# Patient Record
Sex: MALE | Race: WHITE | NOT HISPANIC OR LATINO | Employment: OTHER | URBAN - METROPOLITAN AREA
[De-identification: names, ages, dates, MRNs, and addresses within clinical notes are randomized per-mention and may not be internally consistent; named-entity substitution may affect disease eponyms.]

---

## 2017-01-04 ENCOUNTER — TELEPHONE (OUTPATIENT)
Dept: NEUROLOGY | Facility: CLINIC | Age: 52
End: 2017-01-04

## 2017-01-04 NOTE — TELEPHONE ENCOUNTER
----- Message from Karissa Cote sent at 1/3/2017  3:59 PM CST -----  Wife (Ashlyn) call to see if they can get a sooner appt them 01/16/pls call if something is available at [phone]

## 2017-02-03 ENCOUNTER — HOSPITAL ENCOUNTER (OUTPATIENT)
Dept: RADIOLOGY | Facility: HOSPITAL | Age: 52
Discharge: HOME | End: 2017-02-03
Attending: PSYCHIATRY & NEUROLOGY
Payer: COMMERCIAL

## 2017-02-03 ENCOUNTER — OFFICE VISIT (OUTPATIENT)
Dept: NEUROLOGY | Facility: CLINIC | Age: 52
End: 2017-02-03
Payer: COMMERCIAL

## 2017-02-03 ENCOUNTER — TELEPHONE (OUTPATIENT)
Dept: NEUROLOGY | Facility: CLINIC | Age: 52
End: 2017-02-03

## 2017-02-03 VITALS
SYSTOLIC BLOOD PRESSURE: 153 MMHG | BODY MASS INDEX: 27.09 KG/M2 | HEART RATE: 62 BPM | RESPIRATION RATE: 20 BRPM | HEIGHT: 72 IN | WEIGHT: 200 LBS | DIASTOLIC BLOOD PRESSURE: 97 MMHG

## 2017-02-03 DIAGNOSIS — G40.909 SEIZURE DISORDER: ICD-10-CM

## 2017-02-03 DIAGNOSIS — Z79.899 HIGH RISK MEDICATION USE: ICD-10-CM

## 2017-02-03 DIAGNOSIS — A53.0 POSITIVE RPR TEST: ICD-10-CM

## 2017-02-03 DIAGNOSIS — G40.909 SEIZURE DISORDER: Primary | ICD-10-CM

## 2017-02-03 PROCEDURE — 77080 DXA BONE DENSITY AXIAL: CPT | Mod: 26,,, | Performed by: RADIOLOGY

## 2017-02-03 PROCEDURE — 70260 X-RAY EXAM OF SKULL: CPT | Mod: 26,,, | Performed by: RADIOLOGY

## 2017-02-03 PROCEDURE — 70260 X-RAY EXAM OF SKULL: CPT | Mod: TC,PO

## 2017-02-03 PROCEDURE — 77080 DXA BONE DENSITY AXIAL: CPT | Mod: TC,PO

## 2017-02-03 PROCEDURE — 99999 PR PBB SHADOW E&M-EST. PATIENT-LVL IV: CPT | Mod: PBBFAC,,, | Performed by: PSYCHIATRY & NEUROLOGY

## 2017-02-03 PROCEDURE — 99215 OFFICE O/P EST HI 40 MIN: CPT | Mod: S$GLB,,, | Performed by: PSYCHIATRY & NEUROLOGY

## 2017-02-03 RX ORDER — LACOSAMIDE 50 MG/1
TABLET ORAL
Qty: 120 TABLET | Refills: 1 | Status: SHIPPED | OUTPATIENT
Start: 2017-02-03 | End: 2017-06-27
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
## 2017-02-03 NOTE — MR AVS SNAPSHOT
[facility]  [address]  [facility] [address]  Phone: [phone]  Fax: [phone]                  Earnest Morgan   2/3/2017 8:40 AM   Office Visit    Description:  Male : 1965   Provider:  Rashaad Castrejon Jr., MD   Department:  [facility]           Reason for Visit     Seizures     Memory Loss           Diagnoses this Visit        Comments    Seizure disorder    -  Primary     High risk medication use         Positive RPR test                To Do List           Future Appointments        Provider Department Dept Phone    2/3/2017 10:15 AM [facility] [id number] [facility] [phone]    2/3/2017 10:40 AM [facility] DEXA1 [facility] [phone]    2017 11:45 AM LAB, [place] [facility] [phone]    3/16/2017 3:40 PM Rashaad Castrejon Jr., MD [facility] [phone]      Goals (5 Years of Data)     None       These Medications        Disp Refills Start End    lacosamide (VIMPAT) 50 mg Tab 120 tablet 1 2/3/2017     1 tab PO bid x1 week, then 2 tabs PO bid thereafter    Pharmacy: [address] Ph #: [phone]         [facility]     [facility] Nurse Care Line -  Assistance  Registered nurses in the [facility] provide clinical advisement, health education, appointment booking, and other advisory services.  Call for this free service at [phone].             Medications           Message regarding Medications     Verify the changes and/or additions to your medication regime listed below are the same as discussed with your clinician today.  If any of these changes or additions are incorrect, please notify your healthcare provider.        START taking these NEW medications        Refills    lacosamide (VIMPAT) 50 mg Tab 1    Si tab PO bid x1 week, then 2 tabs PO bid thereafter    Class: Print           Verify that the below list  of medications is an accurate representation of the medications you are currently taking.  If none reported, the list may be blank. If incorrect, please contact your healthcare provider. Carry this list with you in case of emergency.           Current Medications     alprazolam (XANAX) 0.5 MG tablet Take 1 tablet (0.5 mg total) by mouth as needed for Anxiety (Take 1 (one) tablet the night BEFORE MRI, 1 tab morning of MRI, 1 - 2 tabs 30-60 minutes before MRI; do NOT drive (MUST have a ).). Take before MRI/procedure/test. Do NOT drive. You need a .    amlodipine (NORVASC) 10 MG tablet Take 10 mg by mouth once daily.    lacosamide (VIMPAT) 50 mg Tab 1 tab PO bid x1 week, then 2 tabs PO bid thereafter    phenytoin (DILANTIN) 100 MG ER capsule Take 300 mg by mouth 2 (two) times daily. 300 mg BID           Clinical Reference Information           Your Vitals Were     BP Pulse Resp Height Weight BMI    153/97 62 20 6' (1.829 m) 90.7 kg (200 lb) 27.12 kg/m2      Blood Pressure          Most Recent Value    BP  (!)  153/97      Allergies as of 2/3/2017     No Known Allergies      Immunizations Administered on Date of Encounter - 2/3/2017     None      Orders Placed During Today's Visit      Normal Orders This Visit    MRI Brain W WO Contrast     Future Labs/Procedures Expected by Expires    CBC auto differential  2/3/2017 4/4/2018    Comprehensive metabolic panel  2/3/2017 4/4/2018    DXA Bone Density Spine And Hip_Axial Skeleton  2/3/2017 2/3/2018    FTA ANTIBODIES, IGG AND IGM  2/3/2017 4/4/2018    MRI Brain W WO Contrast  2/3/2017 2/3/2018    Phenytoin level, total  2/3/2017 4/4/2018    RPR  2/3/2017 4/4/2018    X-Ray Skull Complete Min 4 Views  2/3/2017 2/3/2018    EEG,w/awake & asleep record  As directed 2/3/2018      Language Assistance Services     ATTENTION: Language assistance services are available, free of charge. Please call [phone].      ATENCIÓN: Si habla español, tiene a obregon disposición  servicios gratuitos de asistencia lingüística. Robert cuevas [phone].     [facility] Ý: N?u b?n nói Ti?ng Vi?t, có các d?ch v? h? tr? ngôn ng? mi?n phí dành cho b?n. G?i s? [phone].         [facility] complies with applicable Federal civil rights laws and does not discriminate on the basis of race, color, national origin, age, disability, or sex.

## 2017-02-03 NOTE — LETTER
February 3, 2017      Chris Babb MD  [address]  [address]  [facility] [address]           [place] [facility]  [address] [place] [address]  Phone: [phone]  Fax: [phone]          Patient: Earnest Moragn   MR Number: [id number]   YOB: 1965   Date of Visit: 2/3/2017       Dear Dr. Chris Babb:    Thank you for referring Earnest Morgan to me for evaluation. Attached you will find relevant portions of my assessment and plan of care.    If you have questions, please do not hesitate to call me. I look forward to following Earnest Morgan along with you.    Sincerely,    Rashaad Castrejon Jr., MD    Enclosure  CC:  No Recipients    If you would like to receive this communication electronically, please contact [email or web address] or [phone] to request more information on [employer] Link access.    For providers and/or their staff who would like to refer a patient to [facility], please contact us through our one-stop-shop provider referral line, [facility], at [phone].    If you feel you have received this communication in error or would no longer like to receive these types of communications, please e-mail [email or web address]

## 2017-02-04 ENCOUNTER — LAB VISIT (OUTPATIENT)
Dept: LAB | Facility: HOSPITAL | Age: 52
End: 2017-02-04
Attending: PSYCHIATRY & NEUROLOGY
Payer: COMMERCIAL

## 2017-02-04 DIAGNOSIS — G40.909 SEIZURE DISORDER: ICD-10-CM

## 2017-02-04 DIAGNOSIS — Z79.899 HIGH RISK MEDICATION USE: ICD-10-CM

## 2017-02-04 DIAGNOSIS — A53.0 POSITIVE RPR TEST: ICD-10-CM

## 2017-02-04 LAB
ALBUMIN SERPL BCP-MCNC: 3.8 G/DL
ALP SERPL-CCNC: 121 U/L
ALT SERPL W/O P-5'-P-CCNC: 13 U/L
ANION GAP SERPL CALC-SCNC: 9 MMOL/L
AST SERPL-CCNC: 15 U/L
BASOPHILS # BLD AUTO: 0.01 K/UL
BASOPHILS NFR BLD: 0.2 %
BILIRUB SERPL-MCNC: 0.3 MG/DL
BUN SERPL-MCNC: 20 MG/DL
CALCIUM SERPL-MCNC: 8.4 MG/DL
CHLORIDE SERPL-SCNC: 110 MMOL/L
CO2 SERPL-SCNC: 21 MMOL/L
CREAT SERPL-MCNC: 1.4 MG/DL
DIFFERENTIAL METHOD: ABNORMAL
EOSINOPHIL # BLD AUTO: 0 K/UL
EOSINOPHIL NFR BLD: 0.5 %
ERYTHROCYTE [DISTWIDTH] IN BLOOD BY AUTOMATED COUNT: 13.1 %
EST. GFR  (AFRICAN AMERICAN): >60 ML/MIN/1.73 M^2
EST. GFR  (NON AFRICAN AMERICAN): 58.2 ML/MIN/1.73 M^2
GLUCOSE SERPL-MCNC: 82 MG/DL
HCT VFR BLD AUTO: 38 %
HGB BLD-MCNC: 12.7 G/DL
LYMPHOCYTES # BLD AUTO: 1.3 K/UL
LYMPHOCYTES NFR BLD: 21.1 %
MCH RBC QN AUTO: 29.5 PG
MCHC RBC AUTO-ENTMCNC: 33.4 %
MCV RBC AUTO: 88 FL
MONOCYTES # BLD AUTO: 0.6 K/UL
MONOCYTES NFR BLD: 9.2 %
NEUTROPHILS # BLD AUTO: 4.3 K/UL
NEUTROPHILS NFR BLD: 68.7 %
PHENYTOIN SERPL-MCNC: 10.3 UG/ML
PLATELET # BLD AUTO: 148 K/UL
PMV BLD AUTO: 10.8 FL
POTASSIUM SERPL-SCNC: 4.8 MMOL/L
PROT SERPL-MCNC: 7.2 G/DL
RBC # BLD AUTO: 4.3 M/UL
SODIUM SERPL-SCNC: 140 MMOL/L
WBC # BLD AUTO: 6.22 K/UL

## 2017-02-04 PROCEDURE — 80053 COMPREHEN METABOLIC PANEL: CPT

## 2017-02-04 PROCEDURE — 85025 COMPLETE CBC W/AUTO DIFF WBC: CPT

## 2017-02-04 PROCEDURE — 86780 TREPONEMA PALLIDUM: CPT

## 2017-02-04 PROCEDURE — 86592 SYPHILIS TEST NON-TREP QUAL: CPT

## 2017-02-04 PROCEDURE — 86593 SYPHILIS TEST NON-TREP QUANT: CPT

## 2017-02-04 PROCEDURE — 36415 COLL VENOUS BLD VENIPUNCTURE: CPT | Mod: PO

## 2017-02-04 PROCEDURE — 80185 ASSAY OF PHENYTOIN TOTAL: CPT

## 2017-02-06 LAB
RPR SER QL: REACTIVE
RPR SER-TITR: ABNORMAL {TITER}

## 2017-02-08 ENCOUNTER — TELEPHONE (OUTPATIENT)
Dept: NEUROLOGY | Facility: CLINIC | Age: 52
End: 2017-02-08

## 2017-02-09 LAB — T PALLIDUM AB SER QL IF: NORMAL

## 2017-02-10 ENCOUNTER — TELEPHONE (OUTPATIENT)
Dept: NEUROLOGY | Facility: CLINIC | Age: 52
End: 2017-02-10

## 2017-02-10 NOTE — TELEPHONE ENCOUNTER
Left message that the MRI should not be done at [facility].  Dr. Castrejon wants a 3 T MRI at [facility]. Awaiting call back from patient.  IM send to Nancy Ly in radiology.

## 2017-03-06 ENCOUNTER — TELEPHONE (OUTPATIENT)
Dept: NEUROLOGY | Facility: CLINIC | Age: 52
End: 2017-03-06

## 2017-03-06 NOTE — TELEPHONE ENCOUNTER
----- Message from Charity Fuller sent at 3/6/2017  9:44 AM CST -----  [facility]  / [phone] office [phone] / Raissa Jones / asking for information on test done in [facility]

## 2017-06-27 ENCOUNTER — TELEPHONE (OUTPATIENT)
Dept: NEUROLOGY | Facility: CLINIC | Age: 52
End: 2017-06-27

## 2017-06-27 DIAGNOSIS — G40.909 SEIZURE DISORDER: ICD-10-CM

## 2017-06-27 RX ORDER — LACOSAMIDE 200 MG/1
200 TABLET ORAL 2 TIMES DAILY
Qty: 60 TABLET | Refills: 11 | Status: SHIPPED | OUTPATIENT
Start: 2017-06-27 | End: 2017-12-05 | Stop reason: SDUPTHER

## 2017-06-27 NOTE — TELEPHONE ENCOUNTER
----- Message from Sheri Lees sent at 6/27/2017 12:19 PM CDT -----  Contact: Ashlyn Morgan  Patient had 3 seizures over the weekend and one yesterday in the doctor's office.  States they're getting worse. Needs to be seen sooner than appt in August.  Please call back at [phone] (home)

## 2017-06-27 NOTE — TELEPHONE ENCOUNTER
The patient had three seizures over the weekend and one in the doctors office yesterday.  He did not have any jerking.  It was like a staring seizure.  He gets a far away look in his eyes. He talks, but does not make any sense. He does not know where he is when it occurs.  He got pale and clammy. He does get an aura, before the episode.  After it occurred, he did not remember where he was.  He was also fatigued after.  He has not missed any medication. Sleep habits fair. Stress level is OK.  His wife states, he is depressed, because he cannot drive or work.  He is also forgetful.

## 2017-06-27 NOTE — TELEPHONE ENCOUNTER
1) If he has multiple seizures in a day or a prolonged seizure (eg- seizure lasting longer than 5 minutes), he needs to go to the ER.  Did he?  If so, where?  We'll need to get records.  2) I wrote for him to work up to 100 mg BID of Vimpat at our last visit.  Increase Vimpat to 200 mg PO BID.  I have written the prescription.  Please call it in.  3) At this point, he needs vEEG monitoring.  Where are we in terms of getting this arranged?  I see that Fely left them a message to schedule the EMU admission back in February.  Find out why hasn't this hasn't happened.  If he goes to the ER at [facility] or [facility] with seizures in the future, they will be able to see this note.  Under those circumstances, I would want him transferred to [facility] for inpatient continuous video EEG monitoring.

## 2017-06-27 NOTE — TELEPHONE ENCOUNTER
If he did not start the medication I wrote for him, I'm not sure how his seizures are going to get under better control.  This is the first I've heard of there being any issue with his insurance company not wanting to pay for his medication.  If he cannot get a medication I wrote him, we have to know about it in order to help him.  We could have tried a prior authorization months ago, if we had known that his insurance company was giving pushback.  We also could have contacted [employer] to see if they could help through their patient assistance program.  Thank you for involving our pharmacy, as [facility] has been an excellent resource for getting patients their medications.      Also, he has to follow through with the evaluation if we are to have the data to take care of him in the best way possible.    His PCP did not contact us with any information related to his event at her office.  Please contact his PCP's office and request records.  Please also make clear to their office that, any time his PCP has concerns related to his epilepsy, I will be happy to speak with her.

## 2017-06-28 NOTE — TELEPHONE ENCOUNTER
Spoke with the patient wife, informed they should have called when they could not afford his medication.  Explained Dr. Castrejon was under the believe that he was taking the Vimpat 100 mg BID when the seizures occurred.  Patient given number for [facility] to see if they can get help from [employer].  She stated they were going to tell Dr. Castrejon at his last appointment that he was not taking the Vimpat, but they missed the appointment. Spoke with Bonnie in Dr. Lewis office, she will fax the clinic visit to us.

## 2017-06-29 ENCOUNTER — TELEPHONE (OUTPATIENT)
Dept: PHARMACY | Facility: CLINIC | Age: 52
End: 2017-06-29

## 2017-07-17 ENCOUNTER — TELEPHONE (OUTPATIENT)
Dept: NEUROLOGY | Facility: CLINIC | Age: 52
End: 2017-07-17

## 2017-07-17 NOTE — TELEPHONE ENCOUNTER
Spoke to carlos, pts spouse, and advised EMU admission 7/18 is approved, pt will check in tn 11-12.

## 2017-07-18 ENCOUNTER — HOSPITAL ENCOUNTER (INPATIENT)
Facility: HOSPITAL | Age: 52
LOS: 4 days | Discharge: HOME | DRG: 101 | End: 2017-07-22
Attending: PSYCHIATRY & NEUROLOGY | Admitting: PSYCHIATRY & NEUROLOGY
Payer: COMMERCIAL

## 2017-07-18 DIAGNOSIS — G40.909 SEIZURE DISORDER: ICD-10-CM

## 2017-07-18 DIAGNOSIS — G47.30 SLEEP APNEA, UNSPECIFIED TYPE: ICD-10-CM

## 2017-07-18 DIAGNOSIS — G40.219 COMPLEX PARTIAL EPILEPSY WITH GENERALIZATION AND WITH INTRACTABLE EPILEPSY: ICD-10-CM

## 2017-07-18 DIAGNOSIS — I10 ESSENTIAL HYPERTENSION: Primary | ICD-10-CM

## 2017-07-18 LAB
ALBUMIN SERPL BCP-MCNC: 3.7 G/DL
ALP SERPL-CCNC: 128 U/L
ALT SERPL W/O P-5'-P-CCNC: 11 U/L
AMPHET+METHAMPHET UR QL: NEGATIVE
ANION GAP SERPL CALC-SCNC: 7 MMOL/L
AST SERPL-CCNC: 13 U/L
BARBITURATES UR QL SCN>200 NG/ML: NEGATIVE
BASOPHILS # BLD AUTO: 0.01 K/UL
BASOPHILS NFR BLD: 0.2 %
BENZODIAZ UR QL SCN>200 NG/ML: NEGATIVE
BILIRUB SERPL-MCNC: 0.2 MG/DL
BILIRUB UR QL STRIP: NEGATIVE
BUN SERPL-MCNC: 18 MG/DL
BZE UR QL SCN: NEGATIVE
CALCIUM SERPL-MCNC: 8.6 MG/DL
CANNABINOIDS UR QL SCN: NEGATIVE
CHLORIDE SERPL-SCNC: 109 MMOL/L
CLARITY UR REFRACT.AUTO: CLEAR
CO2 SERPL-SCNC: 24 MMOL/L
COLOR UR AUTO: NORMAL
CREAT SERPL-MCNC: 1.3 MG/DL
CREAT UR-MCNC: 77 MG/DL
DIFFERENTIAL METHOD: ABNORMAL
EOSINOPHIL # BLD AUTO: 0.1 K/UL
EOSINOPHIL NFR BLD: 1 %
ERYTHROCYTE [DISTWIDTH] IN BLOOD BY AUTOMATED COUNT: 13.2 %
EST. GFR  (AFRICAN AMERICAN): >60 ML/MIN/1.73 M^2
EST. GFR  (NON AFRICAN AMERICAN): >60 ML/MIN/1.73 M^2
GLUCOSE SERPL-MCNC: 84 MG/DL
GLUCOSE UR QL STRIP: NEGATIVE
HCT VFR BLD AUTO: 36.3 %
HGB BLD-MCNC: 12.4 G/DL
HGB UR QL STRIP: NEGATIVE
KETONES UR QL STRIP: NEGATIVE
LEUKOCYTE ESTERASE UR QL STRIP: NEGATIVE
LYMPHOCYTES # BLD AUTO: 1.4 K/UL
LYMPHOCYTES NFR BLD: 27.6 %
MCH RBC QN AUTO: 30.5 PG
MCHC RBC AUTO-ENTMCNC: 34.2 %
MCV RBC AUTO: 89 FL
METHADONE UR QL SCN>300 NG/ML: NEGATIVE
MONOCYTES # BLD AUTO: 0.4 K/UL
MONOCYTES NFR BLD: 7.8 %
NEUTROPHILS # BLD AUTO: 3.1 K/UL
NEUTROPHILS NFR BLD: 63 %
NITRITE UR QL STRIP: NEGATIVE
OPIATES UR QL SCN: NEGATIVE
PCP UR QL SCN>25 NG/ML: NEGATIVE
PH UR STRIP: 6 [PH] (ref 5–8)
PLATELET # BLD AUTO: 125 K/UL
PMV BLD AUTO: 9.5 FL
POTASSIUM SERPL-SCNC: 4.3 MMOL/L
PROT SERPL-MCNC: 7 G/DL
PROT UR QL STRIP: NEGATIVE
RBC # BLD AUTO: 4.06 M/UL
SODIUM SERPL-SCNC: 140 MMOL/L
SP GR UR STRIP: 1.01 (ref 1–1.03)
TOXICOLOGY INFORMATION: NORMAL
URN SPEC COLLECT METH UR: NORMAL
UROBILINOGEN UR STRIP-ACNC: NEGATIVE EU/DL
WBC # BLD AUTO: 4.89 K/UL

## 2017-07-18 PROCEDURE — 95951 PR EEG MONITORING/VIDEORECORD: CPT | Mod: 26,,, | Performed by: PSYCHIATRY & NEUROLOGY

## 2017-07-18 PROCEDURE — 95951 HC EEG MONITORING/VIDEO RECORD: CPT

## 2017-07-18 PROCEDURE — 81003 URINALYSIS AUTO W/O SCOPE: CPT

## 2017-07-18 PROCEDURE — 80299 QUANTITATIVE ASSAY DRUG: CPT

## 2017-07-18 PROCEDURE — 36415 COLL VENOUS BLD VENIPUNCTURE: CPT

## 2017-07-18 PROCEDURE — 20600001 HC STEP DOWN PRIVATE ROOM

## 2017-07-18 PROCEDURE — 99223 1ST HOSP IP/OBS HIGH 75: CPT | Mod: ,,, | Performed by: PSYCHIATRY & NEUROLOGY

## 2017-07-18 PROCEDURE — 95957 EEG DIGITAL ANALYSIS: CPT

## 2017-07-18 PROCEDURE — 85025 COMPLETE CBC W/AUTO DIFF WBC: CPT

## 2017-07-18 PROCEDURE — 25000003 PHARM REV CODE 250: Performed by: PSYCHIATRY & NEUROLOGY

## 2017-07-18 PROCEDURE — 80307 DRUG TEST PRSMV CHEM ANLYZR: CPT

## 2017-07-18 PROCEDURE — 80186 ASSAY OF PHENYTOIN FREE: CPT

## 2017-07-18 PROCEDURE — 80053 COMPREHEN METABOLIC PANEL: CPT

## 2017-07-18 RX ORDER — ONDANSETRON 8 MG/1
8 TABLET, ORALLY DISINTEGRATING ORAL EVERY 8 HOURS PRN
Status: DISCONTINUED | OUTPATIENT
Start: 2017-07-18 | End: 2017-07-22 | Stop reason: HOSPADM

## 2017-07-18 RX ORDER — AMLODIPINE BESYLATE 10 MG/1
10 TABLET ORAL DAILY
Status: DISCONTINUED | OUTPATIENT
Start: 2017-07-19 | End: 2017-07-22 | Stop reason: HOSPADM

## 2017-07-18 RX ORDER — IBUPROFEN 600 MG/1
600 TABLET ORAL EVERY 6 HOURS PRN
Status: DISCONTINUED | OUTPATIENT
Start: 2017-07-18 | End: 2017-07-22 | Stop reason: HOSPADM

## 2017-07-18 RX ORDER — SODIUM CHLORIDE 0.9 % (FLUSH) 0.9 %
3 SYRINGE (ML) INJECTION EVERY 8 HOURS
Status: DISCONTINUED | OUTPATIENT
Start: 2017-07-18 | End: 2017-07-22 | Stop reason: HOSPADM

## 2017-07-18 RX ORDER — DOCUSATE SODIUM 100 MG/1
100 CAPSULE, LIQUID FILLED ORAL 2 TIMES DAILY
Status: DISCONTINUED | OUTPATIENT
Start: 2017-07-18 | End: 2017-07-22 | Stop reason: HOSPADM

## 2017-07-18 RX ADMIN — Medication 3 ML: at 10:07

## 2017-07-18 NOTE — PLAN OF CARE
Problem: Patient Care Overview  Goal: Plan of Care Review  Outcome: Ongoing (interventions implemented as appropriate)  POC reviewed with pt, pt verbalizes understanding. Pt aaox4, OWEN. On continuous EEG monitoring. Pt on tele in NSR. Pt on RA. Pt tolerating regular diet, voiding without issue, needs UA. Pt OOB with SBA. IV SL. Pt currently resting in bed, wife at bedside, call bell within reach, seizure precautions maintained. Will continue to monitor.

## 2017-07-18 NOTE — NURSING
Spoke to Epilepsy resident at [phone], states he will be up to see pt as soon as he can and enter orders. WCTM.

## 2017-07-18 NOTE — NURSING
Spoke to Dr. Marshall re: pt's diet being ordered as NPO, MD states he will update diet order. Also discussed pt's lab orders and timing, states OK for all ordered labs, included CBC and CMP, to be drawn now. WCTM.

## 2017-07-19 PROCEDURE — 95951 HC EEG MONITORING/VIDEO RECORD: CPT

## 2017-07-19 PROCEDURE — 20600001 HC STEP DOWN PRIVATE ROOM

## 2017-07-19 PROCEDURE — 99233 SBSQ HOSP IP/OBS HIGH 50: CPT | Mod: ,,, | Performed by: PSYCHIATRY & NEUROLOGY

## 2017-07-19 PROCEDURE — 25000003 PHARM REV CODE 250: Performed by: PSYCHIATRY & NEUROLOGY

## 2017-07-19 PROCEDURE — 95951 PR EEG MONITORING/VIDEORECORD: CPT | Mod: 26,,, | Performed by: PSYCHIATRY & NEUROLOGY

## 2017-07-19 PROCEDURE — 95957 EEG DIGITAL ANALYSIS: CPT

## 2017-07-19 RX ADMIN — AMLODIPINE BESYLATE 10 MG: 10 TABLET ORAL at 09:07

## 2017-07-19 RX ADMIN — Medication 3 ML: at 02:07

## 2017-07-19 RX ADMIN — Medication 3 ML: at 03:07

## 2017-07-19 RX ADMIN — DOCUSATE SODIUM 100 MG: 100 CAPSULE, LIQUID FILLED ORAL at 09:07

## 2017-07-19 RX ADMIN — Medication 3 ML: at 06:07
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
## 2017-07-19 NOTE — SUBJECTIVE & OBJECTIVE
Past Medical History:   Diagnosis Date    Hypertension     Seizures        Past Surgical History:   Procedure Laterality Date    CHOLECYSTECTOMY         Review of patient's allergies indicates:  No Known Allergies    No current facility-administered medications on file prior to encounter.      Current Outpatient Prescriptions on File Prior to Encounter   Medication Sig    amlodipine (NORVASC) 10 MG tablet Take 10 mg by mouth once daily.    lacosamide (VIMPAT) 200 mg Tab Take 1 tablet (200 mg total) by mouth 2 (two) times daily.    phenytoin (DILANTIN) 100 MG ER capsule Take 300 mg by mouth 2 (two) times daily. 300 mg BID    alprazolam (XANAX) 0.5 MG tablet Take 1 tablet (0.5 mg total) by mouth as needed for Anxiety (Take 1 (one) tablet the night BEFORE MRI, 1 tab morning of MRI, 1 - 2 tabs 30-60 minutes before MRI; do NOT drive (MUST have a ).). Take before MRI/procedure/test. Do NOT drive. You need a .     Continuous Infusions:     Family History     Problem Relation (Age of Onset)    Cancer Mother    Diabetes Mother, Father, Brother, Sister    Heart attack Father    Stroke Sister        Social History Main Topics    Smoking status: Never Smoker    Smokeless tobacco: Never Used    Alcohol use No    Drug use: No    Sexual activity: Not on file     Review of Systems   Constitutional: Negative.    Eyes: Negative.    Respiratory: Negative.    Cardiovascular: Negative.    Gastrointestinal: Negative.    Endocrine: Negative.    Genitourinary: Negative.         Urinary incontinence    Musculoskeletal: Negative.    Neurological: Positive for dizziness, seizures, syncope and headaches.   Psychiatric/Behavioral: Negative.      Objective:     Vital Signs (Most Recent):  Temp: 97.4 °F (36.3 °C) (07/18/17 1631)  Pulse: 66 (07/18/17 1631)  Resp: 19 (07/18/17 1631)  BP: (!) 157/89 (vital signs taken by LIDA Khan & reported to RN) (07/18/17 1631)  SpO2: 99 % (07/18/17 1631) Vital Signs (24h Range):  Temp:   [97.4 °F (36.3 °C)-98.4 °F (36.9 °C)] 97.4 °F (36.3 °C)  Pulse:  [63-66] 66  Resp:  [16-19] 19  SpO2:  [99 %] 99 %  BP: (135-157)/(89-93) 157/89     Weight: 87.1 kg (192 lb)  Body mass index is 26.04 kg/m².    Physical Exam  Constitutional  Well-developed, well-nourished, appears stated age       Cardiovascular  Radial pulses 2+ and symmetric, no LE edema bilaterally   Neurological    * Mental status      - Orientation  Oriented to person, place, time, and situation     - Memory   Intact recent and remote     - Attention/concentration  Attentive, vigilant during exam     - Language  Naming & repetition intact, +2-step commands     - Fund of knowledge  Aware of current events     - Executive  Well-organized thoughts     - Other     * Cranial nerves       - CN II  PERRL, visual fields full to confrontation     - CN III, IV, VI  Extraocular movements full, normal pursuits and saccades     - CN V  Sensation V1 - V3 intact     - CN VII  Face strong and symmetric bilaterally     - CN VIII  Hearing intact bilaterally     - CN IX, X  Palate raises midline and symmetric     - CN XI  SCM and trapezius 5/5 bilaterally     - CN XII  Tongue midline   * Motor  Muscle bulk normal, strength 5/5 throughout   * Sensory   Intact to temperature and vibration throughout   * Coordination  No dysmetria with finger-to-nose or heel-to-shin   * Gait  See below.   * Deep tendon reflexes  2+ and symmetric throughout               Significant Labs:   Recent Lab Results       07/18/17  1736      Albumin 3.7     Alkaline Phosphatase 128     ALT 11     Anion Gap 7(L)     AST 13     Baso # 0.01     Basophil% 0.2     Total Bilirubin 0.2  Comment:  For infants and newborns, interpretation of results should be based  on gestational age, weight and in agreement with clinical  observations.  Premature Infant recommended reference ranges:  Up to 24 hours.............<8.0 mg/dL  Up to 48 hours............<12.0 mg/dL  3-5 days..................<15.0  mg/dL  6-29 days.................<15.0 mg/dL       BUN, Bld 18     Calcium 8.6(L)     Chloride 109     CO2 24     Creatinine 1.3     Differential Method Automated     eGFR if African American >60.0     eGFR if non  >60.0  Comment:  Calculation used to obtain the estimated glomerular filtration  rate (eGFR) is the CKD-EPI equation. Since race is unknown   in our information system, the eGFR values for   -American and Non--American patients are given   for each creatinine result.       Eos # 0.1     Eosinophil% 1.0     Glucose 84     Gran # 3.1     Gran% 63.0     Hematocrit 36.3(L)     Hemoglobin 12.4(L)     Lymph # 1.4     Lymph% 27.6     MCH 30.5     MCHC 34.2     MCV 89     Mono # 0.4     Mono% 7.8     MPV 9.5     Platelets 125(L)     Potassium 4.3     Total Protein 7.0     RBC 4.06(L)     RDW 13.2     Sodium 140     WBC 4.89         All pertinent lab results from the past 24 hours have been reviewed.    Significant Studies: I have reviewed all pertinent imaging results/findings within the past 24 hours.

## 2017-07-19 NOTE — PLAN OF CARE
Problem: Patient Care Overview  Goal: Plan of Care Review  Outcome: Ongoing (interventions implemented as appropriate)  Plan of care reviewed with pt and spouse at bedside, pt able to verbalize understanding and acceptance, aaox4 but has delayed responses.  pleasant calm and cooperative.  Pt free from falls or injury. No new skin breakdown.   VS stable throughout shift. No s/sx of distress noted. No seizure episodes this shift. WCTM    Temp:  [98 °F (36.7 °C)-98.1 °F (36.7 °C)]   Pulse:  [58-63]   Resp:  [18]   BP: (147)/(98)   SpO2:  [94 %-96 %]
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
## 2017-07-19 NOTE — PROGRESS NOTES
[facility]  Neurology-Epilepsy  Progress Note    Patient Name: Earnest Morgan  MRN: [id number]  Admission Date: 7/18/2017  Hospital Length of Stay: 1 days  Code Status: Full Code   Attending Provider: Denny Posey MD  Primary Care Physician: MIKE Caballero   Principal Problem:<principal problem not specified>    Subjective:     Hospital Course:   7/18-7/19- No events overnight. EEG did not show any epileptic discharges.     Interval History: No events overnight.     Current Facility-Administered Medications   Medication Dose Route Frequency Provider Last Rate Last Dose    amlodipine tablet 10 mg  10 mg Oral Daily Franca Marshall MD   10 mg at 07/19/17 0958    docusate sodium capsule 100 mg  100 mg Oral BID Franca Marshall MD   100 mg at 07/19/17 0958    ibuprofen tablet 600 mg  600 mg Oral Q6H PRN Franca Marshall MD        ondansetron disintegrating tablet 8 mg  8 mg Oral Q8H PRN Franca Marshall MD        sodium chloride 0.9% flush 3 mL  3 mL Intravenous Q8H Franca Marshall MD   3 mL at 07/19/17 0636     Continuous Infusions:     Review of Systems   Constitutional: Negative.    Eyes: Negative.    Respiratory: Negative.    Cardiovascular: Negative.    Gastrointestinal: Negative.    Endocrine: Negative.    Genitourinary: Negative.         Urinary incontinence    Musculoskeletal: Negative.    Neurological: Positive for dizziness, seizures, syncope and headaches.   Psychiatric/Behavioral: Negative.      Objective:     Vital Signs (Most Recent):  Temp: 98.1 °F (36.7 °C) (07/19/17 0740)  Pulse: 65 (07/19/17 0740)  Resp: 17 (07/19/17 0740)  BP: (!) 158/97 (07/19/17 0740)  SpO2: 98 % (07/19/17 0740) Vital Signs (24h Range):  Temp:  [97.4 °F (36.3 °C)-98.4 °F (36.9 °C)] 98.1 °F (36.7 °C)  Pulse:  [58-68] 65  Resp:  [16-19] 17  SpO2:  [94 %-99 %] 98 %  BP: (135-158)/(89-98) 158/97     Weight: 87.1 kg (192 lb)  Body mass index is 26.04 kg/m².    Physical Exam  Constitutional   Well-developed, well-nourished, appears stated age       Cardiovascular  Radial pulses 2+ and symmetric, no LE edema bilaterally   Neurological    * Mental status      - Orientation  Oriented to person, place, time, and situation     - Memory   Intact recent and remote     - Attention/concentration  Attentive, vigilant during exam     - Language  Naming & repetition intact, +2-step commands     - Fund of knowledge  Aware of current events     - Executive  Well-organized thoughts     - Other     * Cranial nerves       - CN II  PERRL, visual fields full to confrontation     - CN III, IV, VI  Extraocular movements full, normal pursuits and saccades     - CN V  Sensation V1 - V3 intact     - CN VII  Face strong and symmetric bilaterally     - CN VIII  Hearing intact bilaterally     - CN IX, X  Palate raises midline and symmetric     - CN XI  SCM and trapezius 5/5 bilaterally     - CN XII  Tongue midline   * Motor  Muscle bulk normal, strength 5/5 throughout   * Sensory   Intact to temperature and vibration throughout   * Coordination  No dysmetria with finger-to-nose or heel-to-shin   * Gait  See below.   * Deep tendon reflexes  2+ and symmetric throughout                Significant Labs:   Recent Lab Results       07/18/17  1933 07/18/17  1932 07/18/17  1736      Benzodiazepines Negative       Methadone metabolites Negative       Phencyclidine Negative       Albumin   3.7     Alkaline Phosphatase   128     ALT   11     Amphetamine Screen, Ur Negative       Anion Gap   7(L)     Appearance, UA  Clear      AST   13     Barbiturate Screen, Ur Negative       Baso #   0.01     Basophil%   0.2     Bilirubin (UA)  Negative      Total Bilirubin   0.2  Comment:  For infants and newborns, interpretation of results should be based  on gestational age, weight and in agreement with clinical  observations.  Premature Infant recommended reference ranges:  Up to 24 hours.............<8.0 mg/dL  Up to 48 hours............<12.0  mg/dL  3-5 days..................<15.0 mg/dL  6-29 days.................<15.0 mg/dL       BUN, Bld   18     Calcium   8.6(L)     Chloride   109     CO2   24     Cocaine (Metab.) Negative       Color, UA  Straw      Creatinine   1.3     Creatinine, Random Ur 77.0  Comment:  The random urine reference ranges provided were established   for 24 hour urine collections.  No reference ranges exist for  random urine specimens.  Correlate clinically.         Differential Method   Automated     eGFR if    >60.0     eGFR if non    >60.0  Comment:  Calculation used to obtain the estimated glomerular filtration  rate (eGFR) is the CKD-EPI equation. Since race is unknown   in our information system, the eGFR values for   -American and Non--American patients are given   for each creatinine result.       Eos #   0.1     Eosinophil%   1.0     Glucose   84     Glucose, UA  Negative      Gran #   3.1     Gran%   63.0     Hematocrit   36.3(L)     Hemoglobin   12.4(L)     Ketones, UA  Negative      Leukocytes, UA  Negative      Lymph #   1.4     Lymph%   27.6     MCH   30.5     MCHC   34.2     MCV   89     Mono #   0.4     Mono%   7.8     MPV   9.5     Nitrite, UA  Negative      Occult Blood UA  Negative      Opiate Scrn, Ur Negative       pH, UA  6.0      Platelets   125(L)     Potassium   4.3     Total Protein   7.0     Protein, UA  Negative  Comment:  Recommend a 24 hour urine protein or a urine   protein/creatinine ratio if globulin induced proteinuria is  clinically suspected.        RBC   4.06(L)     RDW   13.2     Sodium   140     Specific Gravity, UA  1.015      Specimen UA  Urine, Clean Catch      Marijuana (THC) Metabolite Negative       Toxicology Information SEE COMMENT  Comment:  This screen includes the following classes of drugs at the   listed cut-off:  Benzodiazepines                  200 ng/ml  Methadone                        300 ng/ml  Cocaine metabolite               300  ng/ml  Opiates                          300 ng/ml  Barbiturates                     200 ng/ml  Amphetamines                    1000 ng/ml  Marijuana metabs (THC)            50 ng/ml  Phencyclidine (PCP)               25 ng/ml  High concentrations of Diphenhydramine may cross-react with  Phencyclidine PCP screening immunoassay giving a false   positive result.  High concentrations of Methylenedioxymethamphetamine (MDMA aka  Ectasy) and other structurally similar compounds may cross-   react with the Amphetamine/Methamphetamine screening   immunoassay giving a false positive result.  A metabolite of the anti-HIV drug Sustiva () may cause  false positive results in the Marijuana metabolite (THC)   screening assay.  Note: This exception list includes only more common   interferants in toxicology screen testing.  Because of many   cross-reactantspositive results on toxicology drug screens   should be confirmed whenever results do not correlate with   clinical presentation.  This report is intended for use in clinical monitoring and  management of patients. It is not intended for use in   employment related drug testing.  Because of any cross-reactants, positive results on toxicology  drug screens should be confirmed whenever results do not  correlate with clinical presentation.  Presumptive positive results are unconfirmed and may be used   only for medical purposes.         Urobilinogen, UA  Negative      WBC   4.89         All pertinent lab results from the past 24 hours have been reviewed.    Significant Studies: I have reviewed all pertinent imaging results/findings within the past 24 hours.    Assessment and Plan:     Essential hypertension    Continue home amlodipine 10 mg        Complex partial epilepsy with generalization and with intractable epilepsy    vEEG  Stop Vimpat and dilantin  Seizure precutions  AED levels  Ativan for GTC > 5 mins            VTE Risk Mitigation         Ordered     Low Risk of VTE   Once      07/18/17 1643          Franca Marshall MD  Neurology-Epilepsy  [facility]

## 2017-07-19 NOTE — HOSPITAL COURSE
7/18-7/19- No events overnight. EEG did not show any epileptic discharges.   7/19-7/20- Patient with 3 seizures. 2 overnight and 1 this am. Patient requiring 2 mg ativan overnight and 10 mg total of valium this morning. EEG shows likely coming from R f/t region. Seizure semiology with classic figure of 4 sign. Load with IV vimpat 400 mg and restart vimpat bid. Patient did have post-ictal confusion and was not oriented to place or time.   7/20-7/21- Patient with no events overnight. EEG showing sharp waves on L temporal chains now, with same R temp sharps as seen in prior studies.   7/21-7/22- No events overnight. EEG shows L temporal sharps at times of drowsiness. Home today.

## 2017-07-20 LAB
PHENYTOIN FREE SERPL-MCNC: <0.8 MCG/ML
PHENYTOIN, TOTAL: 5.4 MCG/ML

## 2017-07-20 PROCEDURE — 95957 EEG DIGITAL ANALYSIS: CPT

## 2017-07-20 PROCEDURE — 63600175 PHARM REV CODE 636 W HCPCS: Performed by: PSYCHIATRY & NEUROLOGY

## 2017-07-20 PROCEDURE — 99233 SBSQ HOSP IP/OBS HIGH 50: CPT | Mod: ,,, | Performed by: PSYCHIATRY & NEUROLOGY

## 2017-07-20 PROCEDURE — 20600001 HC STEP DOWN PRIVATE ROOM

## 2017-07-20 PROCEDURE — 25000003 PHARM REV CODE 250: Performed by: PSYCHIATRY & NEUROLOGY

## 2017-07-20 PROCEDURE — 63600175 PHARM REV CODE 636 W HCPCS

## 2017-07-20 PROCEDURE — C9254 INJECTION, LACOSAMIDE: HCPCS | Performed by: PSYCHIATRY & NEUROLOGY

## 2017-07-20 PROCEDURE — 95951 HC EEG MONITORING/VIDEO RECORD: CPT

## 2017-07-20 PROCEDURE — 95951 PR EEG MONITORING/VIDEORECORD: CPT | Mod: 26,,, | Performed by: PSYCHIATRY & NEUROLOGY

## 2017-07-20 PROCEDURE — 63600175 PHARM REV CODE 636 W HCPCS: Performed by: PHYSICIAN ASSISTANT

## 2017-07-20 RX ORDER — DIAZEPAM 10 MG/2ML
5 INJECTION INTRAMUSCULAR ONCE
Status: COMPLETED | OUTPATIENT
Start: 2017-07-20 | End: 2017-07-20

## 2017-07-20 RX ORDER — LACOSAMIDE 100 MG/1
200 TABLET ORAL 2 TIMES DAILY
Status: DISCONTINUED | OUTPATIENT
Start: 2017-07-20 | End: 2017-07-22 | Stop reason: HOSPADM

## 2017-07-20 RX ORDER — LORAZEPAM 2 MG/ML
2 INJECTION INTRAMUSCULAR ONCE
Status: COMPLETED | OUTPATIENT
Start: 2017-07-20 | End: 2017-07-20

## 2017-07-20 RX ORDER — LORAZEPAM 2 MG/ML
INJECTION INTRAMUSCULAR
Status: COMPLETED
Start: 2017-07-20 | End: 2017-07-20

## 2017-07-20 RX ORDER — DIAZEPAM 10 MG/2ML
INJECTION INTRAMUSCULAR
Status: DISPENSED
Start: 2017-07-20 | End: 2017-07-20

## 2017-07-20 RX ORDER — LACOSAMIDE 100 MG/1
200 TABLET ORAL 2 TIMES DAILY
Status: DISCONTINUED | OUTPATIENT
Start: 2017-07-20 | End: 2017-07-20 | Stop reason: SDUPTHER

## 2017-07-20 RX ADMIN — DIAZEPAM 5 MG: 5 INJECTION, SOLUTION INTRAMUSCULAR; INTRAVENOUS at 08:07

## 2017-07-20 RX ADMIN — LORAZEPAM 2 MG: 2 INJECTION INTRAMUSCULAR at 12:07

## 2017-07-20 RX ADMIN — LACOSAMIDE 200 MG: 100 TABLET, FILM COATED ORAL at 10:07

## 2017-07-20 RX ADMIN — SODIUM CHLORIDE 400 MG: 9 INJECTION, SOLUTION INTRAVENOUS at 12:07

## 2017-07-20 RX ADMIN — Medication 10 ML: at 06:07

## 2017-07-20 RX ADMIN — LORAZEPAM 2 MG: 2 INJECTION INTRAMUSCULAR; INTRAVENOUS at 12:07

## 2017-07-20 RX ADMIN — Medication 3 ML: at 09:07

## 2017-07-20 RX ADMIN — AMLODIPINE BESYLATE 10 MG: 10 TABLET ORAL at 09:07

## 2017-07-20 RX ADMIN — Medication 3 ML: at 02:07

## 2017-07-20 RX ADMIN — DOCUSATE SODIUM 100 MG: 100 CAPSULE, LIQUID FILLED ORAL at 09:07

## 2017-07-20 NOTE — NURSING
Epilepsy placed telephone order for 2 mg. Ativan IV. This nurse was also instructed to no longer enforce/implement sleep deprivation instructions.

## 2017-07-20 NOTE — NURSING
"Pt alert, following commands. OWEN; some fine twitching to his neck muscles observed. Scant amount of blood noticed during suctioning mid event; L tip of tongue reddened/abraded but no longer bleeding. Loose front tooth upon inspection described by wife as "not new" and attributed to AEDs. Pt was not incontinent during episode.    Pt is verbal  at 2255 but with word finding difficulty: when gesturing to his wife states, "That's my baby", but when asked to give specific location states "I can't answer that right now". Per wife, this grogginess and aphasia can last up to 30 minutes, and gradually subsides leaving patient with generalized aches and weakness. Wife adds that patient can have up to three of these episodes per night,  by an hour or so.     Patient and wife re-oriented to room, call light, event button. Pt instructed to use urinal until he is once again able to ambulate safely, pt nods in agreement. He denies pain, HA, SOB (now on RA satting 95%.  "

## 2017-07-20 NOTE — PROGRESS NOTES
[facility]  Neurology-Epilepsy  Progress Note    Patient Name: Earnest Morgan  MRN: [id number]  Admission Date: 7/18/2017  Hospital Length of Stay: 2 days  Code Status: Full Code   Attending Provider: Denny Posey MD  Primary Care Physician: MIKE Caballero   Principal Problem:<principal problem not specified>    Subjective:     Hospital Course:   7/18-7/19- No events overnight. EEG did not show any epileptic discharges.   7/19-7/20- Patient with 3 seizures. 2 overnight and 1 this am. Patient requiring 2 mg ativan overnight and 10 mg total of valium this morning. EEG shows likely coming from R f/t region. Seizure semiology with classic figure of 4 sign. Load with IV vimpat 400 mg and restart vimpat bid. Patient did have post-ictal confusion and was not oriented to place or time.     Interval History: patient with multiple seizures (three) since last night. Ativan 2 mg given overnight, 10 mg valium total given this am following 2 episodes.     Current Facility-Administered Medications   Medication Dose Route Frequency Provider Last Rate Last Dose    amlodipine tablet 10 mg  10 mg Oral Daily Franca Marshall MD   10 mg at 07/20/17 0952    diazePAM 5 mg/mL injection             docusate sodium capsule 100 mg  100 mg Oral BID Franca Marshall MD   100 mg at 07/20/17 0952    ibuprofen tablet 600 mg  600 mg Oral Q6H PRN Franca Marshall MD        lacosamide (VIMPAT) 400 mg in sodium chloride 0.9% 100 mL IVPB  400 mg Intravenous Once Franca Marshall MD        lacosamide tablet 200 mg  200 mg Oral BID Franca Marshall MD        ondansetron disintegrating tablet 8 mg  8 mg Oral Q8H PRN Franca Marshall MD        sodium chloride 0.9% flush 3 mL  3 mL Intravenous Q8H Franca Marshall MD   10 mL at 07/20/17 0600     Continuous Infusions:     Review of Systems   Constitutional: Negative.    Eyes: Negative.    Respiratory: Negative.    Cardiovascular: Negative.     Gastrointestinal: Negative.    Endocrine: Negative.    Genitourinary: Negative.         Urinary incontinence    Musculoskeletal: Negative.    Neurological: Positive for dizziness, seizures, syncope and headaches.   Psychiatric/Behavioral: Negative.      Objective:     Vital Signs (Most Recent):  Temp: 98.5 °F (36.9 °C) (07/20/17 0911)  Pulse: 89 (07/20/17 0911)  Resp: 17 (07/20/17 0911)  BP: 131/85 (07/20/17 0911)  SpO2: 95 % (07/20/17 0911) Vital Signs (24h Range):  Temp:  [97.3 °F (36.3 °C)-98.6 °F (37 °C)] 98.5 °F (36.9 °C)  Pulse:  [] 89  Resp:  [16-18] 17  SpO2:  [81 %-100 %] 95 %  BP: (112-194)/(66-93) 131/85     Weight: 87.1 kg (192 lb)  Body mass index is 26.04 kg/m².    Physical Exam        Constitutional  post-ictal, confused.       Cardiovascular  Radial pulses 2+ and symmetric, no LE edema bilaterally   Neurological    * Mental status     Patient is post-ictal, therefore mental status unable to be tested                                                                            * Gait  not tested   * Deep tendon reflexes  2+ and symmetric throughout           Significant Labs:   Recent Lab Results     None        All pertinent lab results from the past 24 hours have been reviewed.    Significant Studies: I have reviewed all pertinent imaging results/findings within the past 24 hours.    Assessment and Plan:     Essential hypertension    Continue home amlodipine 10 mg        Complex partial epilepsy with generalization and with intractable epilepsy    vEEG  Vimpat 400 mg IV x 1  Restart home vimpat  Continue to monitor to ensure seizures are not coming from L side of brain  3T MRI reviewed- no abnormality found. Will likely need a PET scan (setting this up) and possibly a HEDY scan  Seizure precutions  AED levels  Ativan for GTC > 5 mins            VTE Risk Mitigation         Ordered     Low Risk of VTE  Once      07/18/17 2739          Franca Marshall MD  Neurology-Epilepsy  [facility]  [place]

## 2017-07-20 NOTE — SIGNIFICANT EVENT
2330: called to room for tonic clonic movements lasting 15-20 seconds. 95% O2 on 2L NC.  /81.  2334 pt not following commands, eye closed. 96% on 2LNC, , /74  2340 opens eyes and moves in response to pain. Looks toward wife when asked where she is. VSS, 95% on 2L NC, . 141/75.    Dr Shafer paged to alert  him of pt's second seizure in 2 hours. No new orders given.

## 2017-07-20 NOTE — PLAN OF CARE
07/20/17 0823   Discharge Assessment   Assessment Type Discharge Planning Assessment   Confirmed/corrected address and phone number on facesheet? Yes   Assessment information obtained from? Patient   Expected Length of Stay (days) 2   Communicated expected length of stay with patient/caregiver yes   Prior to hospitilization cognitive status: Alert/Oriented   Prior to hospitalization functional status: Independent   Current cognitive status: Alert/Oriented   Current Functional Status: Independent   Lives With spouse   Able to Return to Prior Arrangements yes   Is patient able to care for self after discharge? Yes   How many people do you have in your home that can help with your care after discharge? 1   Who are your caregiver(s) and their phone number(s)? ([phone] Spouse)   Readmission Within The Last 30 Days no previous admission in last 30 days   Patient currently being followed by outpatient case management? No   Patient currently receives home health services? No   Does the patient currently use HME? No   Patient currently receives private duty nursing? N/A   Patient currently receives any other outside agency services? No   Equipment Currently Used at Home none   Do you have any problems affording any of your prescribed medications? No   Is the patient taking medications as prescribed? yes   Do you have any financial concerns preventing you from receiving the healthcare you need? No   Does the patient have transportation to healthcare appointments? No   Does the patient receive services at the [facility]? No   Are there any open cases? No   Discharge Plan A Home   Discharge Plan B Home with family   Patient/Family In Agreement With Plan yes

## 2017-07-20 NOTE — PLAN OF CARE
"Problem: Patient Care Overview  Goal: Plan of Care Review  POC reviewed with patient and wife.  Verbalized understanding.  Patient remains free from falls, skin breakdown.  Call light remained within reach.  Neuro checks and vital signs done every 4 hours.     Patient had 2 seizure events during this shift.  1st one at 22:30 and the 2nd one at 23:30.  Both were of tonic clonic (jerking) movement.  Epilepsy instructed to give 2 mg. Ativan IV.  Patient tolerated well.        Patient states he "feels weak and sore in his arms".  Ongoing interventions implemented as appropriate.  Seizure precautions and seizure safety maintained.  Will continue to monitor.  Patient's wife at bedside.        "

## 2017-07-20 NOTE — NURSING
Epilepsy paged again to inform that the patient had a second seizure of tonic clonic movement (jerking) and to ask about giving patient Ativan. Patient having a tonic clonic seizure every hour for the past two hours.  Received call back.  No new orders given.

## 2017-07-20 NOTE — SIGNIFICANT EVENT
2233: nursing staffed notified pt started having seizure like activity, Pt tonic clonic for 30 secs  2234: 194/84, O2 sat 85 RA, 88 HR; Nonrebreather applied 100%-- O2 Sat 97%  2237: pt opened his eyes  2238: 166/76, HR 93, O2 99% nonrebreather  2241: pt more alert, keeping eyes open, able to respond and follow some commands  2243: 152/78, , 97% nonrebreather. Not able to squeeze hands, mouth moving but no words coming out. Responds to his name. Follows commands to move feet and arms.  2246: Pt nonverbal  2248: Pt states he is at home, states his name and wife's name  2249: 164/91, , RA 96%

## 2017-07-20 NOTE — NURSING
Epilepsy was paged in regards to sleep deprivation orders.  Patient was instructed to but no orders were written as well as the medication for in the AM after sleep deprivation.

## 2017-07-20 NOTE — ASSESSMENT & PLAN NOTE
vEEG  Vimpat 400 mg IV x 1  Restart home vimpat  Continue to monitor to ensure seizures are not coming from L side of brain  3T MRI reviewed- no abnormality found. Will likely need a PET scan (setting this up) and possibly a HEDY scan  Seizure precutions  AED levels  Ativan for GTC > 5 mins

## 2017-07-20 NOTE — PROGRESS NOTES
Called into room by staff at 0805, patient noted to be having a tonic clonic episode lasting approximately 5 minutes. O@ sats dropped to low 80's patient was then placed on NRB mask where his sats returned to 98%. Very agitated, confused and restless, continued to pull at tele monitor, clothes and oxygen and climb out of bed. Valium 10mg IVP was given in total during this episode. Aphasia and difficulty finding words and restlessness noted in post ictal state. He is alert to self, year and setting at this time.

## 2017-07-20 NOTE — SUBJECTIVE & OBJECTIVE
Interval History: patient with multiple seizures (three) since last night. Ativan 2 mg given overnight, 10 mg valium total given this am following 2 episodes.     Current Facility-Administered Medications   Medication Dose Route Frequency Provider Last Rate Last Dose    amlodipine tablet 10 mg  10 mg Oral Daily Franca Marshall MD   10 mg at 07/20/17 0952    diazePAM 5 mg/mL injection             docusate sodium capsule 100 mg  100 mg Oral BID Franca Marshall MD   100 mg at 07/20/17 0952    ibuprofen tablet 600 mg  600 mg Oral Q6H PRN Franca Marshall MD        lacosamide (VIMPAT) 400 mg in sodium chloride 0.9% 100 mL IVPB  400 mg Intravenous Once Franca Marshall MD        lacosamide tablet 200 mg  200 mg Oral BID Franca Marshall MD        ondansetron disintegrating tablet 8 mg  8 mg Oral Q8H PRN Franca Marshall MD        sodium chloride 0.9% flush 3 mL  3 mL Intravenous Q8H Franca Marshall MD   10 mL at 07/20/17 0600     Continuous Infusions:     Review of Systems   Constitutional: Negative.    Eyes: Negative.    Respiratory: Negative.    Cardiovascular: Negative.    Gastrointestinal: Negative.    Endocrine: Negative.    Genitourinary: Negative.         Urinary incontinence    Musculoskeletal: Negative.    Neurological: Positive for dizziness, seizures, syncope and headaches.   Psychiatric/Behavioral: Negative.      Objective:     Vital Signs (Most Recent):  Temp: 98.5 °F (36.9 °C) (07/20/17 0911)  Pulse: 89 (07/20/17 0911)  Resp: 17 (07/20/17 0911)  BP: 131/85 (07/20/17 0911)  SpO2: 95 % (07/20/17 0911) Vital Signs (24h Range):  Temp:  [97.3 °F (36.3 °C)-98.6 °F (37 °C)] 98.5 °F (36.9 °C)  Pulse:  [] 89  Resp:  [16-18] 17  SpO2:  [81 %-100 %] 95 %  BP: (112-194)/(66-93) 131/85     Weight: 87.1 kg (192 lb)  Body mass index is 26.04 kg/m².    Physical Exam        Constitutional  post-ictal, confused.       Cardiovascular  Radial pulses 2+ and symmetric, no LE edema  bilaterally   Neurological    * Mental status     Patient is post-ictal, therefore mental status unable to be tested                                                                            * Gait  not tested   * Deep tendon reflexes  2+ and symmetric throughout           Significant Labs:   Recent Lab Results     None        All pertinent lab results from the past 24 hours have been reviewed.    Significant Studies: I have reviewed all pertinent imaging results/findings within the past 24 hours.

## 2017-07-21 LAB — LACOSAMIDE: 2.1 MCG/ML

## 2017-07-21 PROCEDURE — 20600001 HC STEP DOWN PRIVATE ROOM

## 2017-07-21 PROCEDURE — 95951 PR EEG MONITORING/VIDEORECORD: CPT | Mod: 26,,, | Performed by: PSYCHIATRY & NEUROLOGY

## 2017-07-21 PROCEDURE — 95951 HC EEG MONITORING/VIDEO RECORD: CPT

## 2017-07-21 PROCEDURE — 95957 EEG DIGITAL ANALYSIS: CPT

## 2017-07-21 PROCEDURE — 99233 SBSQ HOSP IP/OBS HIGH 50: CPT | Mod: ,,, | Performed by: PSYCHIATRY & NEUROLOGY

## 2017-07-21 PROCEDURE — 25000003 PHARM REV CODE 250: Performed by: PSYCHIATRY & NEUROLOGY

## 2017-07-21 RX ADMIN — AMLODIPINE BESYLATE 10 MG: 10 TABLET ORAL at 08:07

## 2017-07-21 RX ADMIN — Medication 3 ML: at 02:07

## 2017-07-21 RX ADMIN — Medication 3 ML: at 08:07

## 2017-07-21 RX ADMIN — LACOSAMIDE 200 MG: 100 TABLET, FILM COATED ORAL at 08:07

## 2017-07-21 RX ADMIN — DOCUSATE SODIUM 100 MG: 100 CAPSULE, LIQUID FILLED ORAL at 08:07

## 2017-07-21 NOTE — SUBJECTIVE & OBJECTIVE
Interval History: No events overnight    Current Facility-Administered Medications   Medication Dose Route Frequency Provider Last Rate Last Dose    amlodipine tablet 10 mg  10 mg Oral Daily Franca Marshall MD   10 mg at 07/21/17 0845    docusate sodium capsule 100 mg  100 mg Oral BID Franca Marshall MD   100 mg at 07/21/17 0845    ibuprofen tablet 600 mg  600 mg Oral Q6H PRN Franca Marshall MD        lacosamide tablet 200 mg  200 mg Oral BID Franca Marshall MD   200 mg at 07/21/17 0841    ondansetron disintegrating tablet 8 mg  8 mg Oral Q8H PRN Franca Marshall MD        sodium chloride 0.9% flush 3 mL  3 mL Intravenous Q8H Franca Marshall MD   3 mL at 07/21/17 1441     Continuous Infusions:     Review of Systems   Constitutional: Negative.    Eyes: Negative.    Respiratory: Negative.    Cardiovascular: Negative.    Gastrointestinal: Negative.    Endocrine: Negative.    Genitourinary: Negative.         Urinary incontinence    Musculoskeletal: Negative.    Neurological: Positive for dizziness, seizures, syncope and headaches.   Psychiatric/Behavioral: Negative.      Objective:     Vital Signs (Most Recent):  Temp: 96.1 °F (35.6 °C) (07/21/17 1242)  Pulse: 67 (07/21/17 1242)  Resp: 18 (07/21/17 1242)  BP: (!) 139/91 (07/21/17 1242)  SpO2: 95 % (07/21/17 1242) Vital Signs (24h Range):  Temp:  [96.1 °F (35.6 °C)-99.1 °F (37.3 °C)] 96.1 °F (35.6 °C)  Pulse:  [60-76] 67  Resp:  [18-20] 18  SpO2:  [95 %-100 %] 95 %  BP: (123-148)/(74-91) 139/91     Weight: 87.1 kg (192 lb)  Body mass index is 26.04 kg/m².    Physical Exam        Constitutional  Well-developed, well-nourished, appears stated age       Cardiovascular  Radial pulses 2+ and symmetric, no LE edema bilaterally   Neurological    * Mental status      - Orientation  Oriented to person, place, time, and situation     - Memory   Intact recent and remote     - Attention/concentration  Attentive, vigilant during exam     - Language   Naming & repetition intact, +2-step commands     - Fund of knowledge  Aware of current events     - Executive  Well-organized thoughts     - Other     * Cranial nerves       - CN II  PERRL, visual fields full to confrontation     - CN III, IV, VI  Extraocular movements full, normal pursuits and saccades     - CN V  Sensation V1 - V3 intact     - CN VII  Face strong and symmetric bilaterally     - CN VIII  Hearing intact bilaterally     - CN IX, X  Palate raises midline and symmetric     - CN XI  SCM and trapezius 5/5 bilaterally     - CN XII  Tongue midline   * Motor  Muscle bulk normal, strength 5/5 throughout   * Sensory   Intact to temperature and vibration throughout   * Coordination  No dysmetria with finger-to-nose or heel-to-shin   * Gait  not tested   * Deep tendon reflexes  1+ and symmetric throughout            Significant Labs: Urine Studies: No results for input(s): COLORU, APPEARANCEUA, PHUR, SPECGRAV, PROTEINUA, GLUCUA, KETONESU, BILIRUBINUA, OCCULTUA, NITRITE, UROBILINOGEN, LEUKOCYTESUR, RBCUA, WBCUA, BACTERIA, SQUAMEPITHEL, HYALINECASTS in the last 48 hours.    Invalid input(s): JOSEFINA  All pertinent lab results from the past 24 hours have been reviewed.    Significant Studies: I have reviewed all pertinent imaging results/findings within the past 24 hours.

## 2017-07-21 NOTE — PROGRESS NOTES
[facility]  Neurology-Epilepsy  Progress Note    Patient Name: Earnest Morgan  MRN: [id number]  Admission Date: 7/18/2017  Hospital Length of Stay: 3 days  Code Status: Full Code   Attending Provider: Denny Posey MD  Primary Care Physician: MIKE Caballero   Principal Problem:<principal problem not specified>    Subjective:     Hospital Course:   7/18-7/19- No events overnight. EEG did not show any epileptic discharges.   7/19-7/20- Patient with 3 seizures. 2 overnight and 1 this am. Patient requiring 2 mg ativan overnight and 10 mg total of valium this morning. EEG shows likely coming from R f/t region. Seizure semiology with classic figure of 4 sign. Load with IV vimpat 400 mg and restart vimpat bid. Patient did have post-ictal confusion and was not oriented to place or time.   7/20-7/21- Patient with no events overnight. EEG showing sharp waves on L temporal chains now, with same R temp sharps as seen in prior studies.     Interval History: No events overnight    Current Facility-Administered Medications   Medication Dose Route Frequency Provider Last Rate Last Dose    amlodipine tablet 10 mg  10 mg Oral Daily Franca Marshall MD   10 mg at 07/21/17 0845    docusate sodium capsule 100 mg  100 mg Oral BID Franca Marshall MD   100 mg at 07/21/17 0845    ibuprofen tablet 600 mg  600 mg Oral Q6H PRN Franca Marshall MD        lacosamide tablet 200 mg  200 mg Oral BID Franca Marshall MD   200 mg at 07/21/17 0841    ondansetron disintegrating tablet 8 mg  8 mg Oral Q8H PRN Franca Marshall MD        sodium chloride 0.9% flush 3 mL  3 mL Intravenous Q8H Franca Marshall MD   3 mL at 07/21/17 1441     Continuous Infusions:     Review of Systems   Constitutional: Negative.    Eyes: Negative.    Respiratory: Negative.    Cardiovascular: Negative.    Gastrointestinal: Negative.    Endocrine: Negative.    Genitourinary: Negative.         Urinary incontinence     Musculoskeletal: Negative.    Neurological: Positive for dizziness, seizures, syncope and headaches.   Psychiatric/Behavioral: Negative.      Objective:     Vital Signs (Most Recent):  Temp: 96.1 °F (35.6 °C) (07/21/17 1242)  Pulse: 67 (07/21/17 1242)  Resp: 18 (07/21/17 1242)  BP: (!) 139/91 (07/21/17 1242)  SpO2: 95 % (07/21/17 1242) Vital Signs (24h Range):  Temp:  [96.1 °F (35.6 °C)-99.1 °F (37.3 °C)] 96.1 °F (35.6 °C)  Pulse:  [60-76] 67  Resp:  [18-20] 18  SpO2:  [95 %-100 %] 95 %  BP: (123-148)/(74-91) 139/91     Weight: 87.1 kg (192 lb)  Body mass index is 26.04 kg/m².    Physical Exam        Constitutional  Well-developed, well-nourished, appears stated age       Cardiovascular  Radial pulses 2+ and symmetric, no LE edema bilaterally   Neurological    * Mental status      - Orientation  Oriented to person, place, time, and situation     - Memory   Intact recent and remote     - Attention/concentration  Attentive, vigilant during exam     - Language  Naming & repetition intact, +2-step commands     - Fund of knowledge  Aware of current events     - Executive  Well-organized thoughts     - Other     * Cranial nerves       - CN II  PERRL, visual fields full to confrontation     - CN III, IV, VI  Extraocular movements full, normal pursuits and saccades     - CN V  Sensation V1 - V3 intact     - CN VII  Face strong and symmetric bilaterally     - CN VIII  Hearing intact bilaterally     - CN IX, X  Palate raises midline and symmetric     - CN XI  SCM and trapezius 5/5 bilaterally     - CN XII  Tongue midline   * Motor  Muscle bulk normal, strength 5/5 throughout   * Sensory   Intact to temperature and vibration throughout   * Coordination  No dysmetria with finger-to-nose or heel-to-shin   * Gait  not tested   * Deep tendon reflexes  1+ and symmetric throughout            Significant Labs: Urine Studies: No results for input(s): COLORU, APPEARANCEUA, PHUR, SPECGRAV, PROTEINUA, GLUCUA, KETONESU, BILIRUBINUA,  OCCULTUA, NITRITE, UROBILINOGEN, LEUKOCYTESUR, RBCUA, WBCUA, BACTERIA, SQUAMEPITHEL, HYALINECASTS in the last 48 hours.    Invalid input(s): JOSEFINA  All pertinent lab results from the past 24 hours have been reviewed.    Significant Studies: I have reviewed all pertinent imaging results/findings within the past 24 hours.    Assessment and Plan:     Essential hypertension    Continue home amlodipine 10 mg        Complex partial epilepsy with generalization and with intractable epilepsy    vEEG  Restart home vimpat  Continue to monitor to ensure seizures are not coming from L side of brain  3T MRI reviewed- no abnormality found. Will likely need a PET scan (setting this up) and possibly a HEDY scan  Seizure precutions  AED levels  Will require a PET scan and HEDY scan done as outpatient.   Can be d/c tomorrow if no events overnight.   Ativan for GTC > 5 mins            VTE Risk Mitigation         Ordered     Low Risk of VTE  Once      07/18/17 1560          Franca Marshall MD  Neurology-Epilepsy  [facility]

## 2017-07-21 NOTE — ASSESSMENT & PLAN NOTE
vEEG  Restart home vimpat  Continue to monitor to ensure seizures are not coming from L side of brain  3T MRI reviewed- no abnormality found. Will likely need a PET scan (setting this up) and possibly a HEDY scan  Seizure precutions  AED levels  Will require a PET scan and HEDY scan done as outpatient.   Can be d/c tomorrow if no events overnight.   Ativan for GTC > 5 mins

## 2017-07-21 NOTE — PLAN OF CARE
Problem: Patient Care Overview  Goal: Plan of Care Review  POC reviewed with patient and wife.  Verbalized understanding.  Patient remains free from falls, skin breakdown, and injury.  Call light remained within reach and side rails up and padded.  Neuro checks and vital signs done every 4 hours.  No acute events during this shift.  Seizure precautions and seizure safety maintained.  Ongoing interventions implemented as appropriate.  Will continue to monitor.

## 2017-07-22 VITALS
SYSTOLIC BLOOD PRESSURE: 133 MMHG | DIASTOLIC BLOOD PRESSURE: 90 MMHG | HEIGHT: 72 IN | TEMPERATURE: 97 F | WEIGHT: 192 LBS | BODY MASS INDEX: 26.01 KG/M2 | RESPIRATION RATE: 16 BRPM | OXYGEN SATURATION: 98 % | HEART RATE: 64 BPM

## 2017-07-22 PROCEDURE — 99233 SBSQ HOSP IP/OBS HIGH 50: CPT | Mod: ,,, | Performed by: PSYCHIATRY & NEUROLOGY

## 2017-07-22 PROCEDURE — 95813 EEG EXTND MNTR 61-119 MIN: CPT | Mod: 26,,, | Performed by: PSYCHIATRY & NEUROLOGY

## 2017-07-22 PROCEDURE — 25000003 PHARM REV CODE 250: Performed by: PSYCHIATRY & NEUROLOGY

## 2017-07-22 RX ADMIN — Medication 3 ML: at 06:07

## 2017-07-22 RX ADMIN — LACOSAMIDE 200 MG: 100 TABLET, FILM COATED ORAL at 09:07

## 2017-07-22 RX ADMIN — DOCUSATE SODIUM 100 MG: 100 CAPSULE, LIQUID FILLED ORAL at 09:07

## 2017-07-22 RX ADMIN — AMLODIPINE BESYLATE 10 MG: 10 TABLET ORAL at 09:07
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
## 2017-07-22 NOTE — NURSING
IV discontinued. EEG leads off per tech. Discharge instructions were reviewed with patient and wife. They gave verbal feedback. Patient asked to walk out of the hospital to the parking garage so I asked him to walk to the end of the urbano and he did it very well. Patient laft floor under his own power for home with all of their belongings.

## 2017-07-22 NOTE — SUBJECTIVE & OBJECTIVE
Interval History: no events overnight.     Current Facility-Administered Medications   Medication Dose Route Frequency Provider Last Rate Last Dose    amlodipine tablet 10 mg  10 mg Oral Daily Franca Marshall MD   10 mg at 07/21/17 0845    docusate sodium capsule 100 mg  100 mg Oral BID Franca Marshall MD   100 mg at 07/21/17 0845    ibuprofen tablet 600 mg  600 mg Oral Q6H PRN Franca Marshall MD        lacosamide tablet 200 mg  200 mg Oral BID Franca Marshall MD   200 mg at 07/21/17 2004    ondansetron disintegrating tablet 8 mg  8 mg Oral Q8H PRN Franca Marshall MD        sodium chloride 0.9% flush 3 mL  3 mL Intravenous Q8H Franca Marshall MD   3 mL at 07/22/17 0600     Continuous Infusions:     Review of Systems   Constitutional: Negative.    Eyes: Negative.    Respiratory: Negative.    Cardiovascular: Negative.    Gastrointestinal: Negative.    Endocrine: Negative.    Genitourinary: Negative.         Urinary incontinence    Musculoskeletal: Negative.    Neurological: Positive for dizziness, seizures, syncope and headaches.   Psychiatric/Behavioral: Negative.      Objective:     Vital Signs (Most Recent):  Temp: 97.4 °F (36.3 °C) (07/22/17 0748)  Pulse: 64 (07/22/17 0748)  Resp: 16 (07/22/17 0748)  BP: (!) 133/90 (07/22/17 0748)  SpO2: 98 % (07/22/17 0748) Vital Signs (24h Range):  Temp:  [96.1 °F (35.6 °C)-98.3 °F (36.8 °C)] 97.4 °F (36.3 °C)  Pulse:  [58-71] 64  Resp:  [16-18] 16  SpO2:  [93 %-99 %] 98 %  BP: (128-142)/(71-91) 133/90     Weight: 87.1 kg (192 lb)  Body mass index is 26.04 kg/m².    Physical Exam        Constitutional  Well-developed, well-nourished, appears stated age       Cardiovascular  Radial pulses 2+ and symmetric, no LE edema bilaterally   Neurological    * Mental status      - Orientation  Oriented to person, place, time, and situation     - Memory   Intact recent and remote     - Attention/concentration  Attentive, vigilant during exam     - Language   Naming & repetition intact, +2-step commands     - Fund of knowledge  Aware of current events     - Executive  Well-organized thoughts     - Other     * Cranial nerves       - CN II  PERRL, visual fields full to confrontation     - CN III, IV, VI  Extraocular movements full, normal pursuits and saccades     - CN V  Sensation V1 - V3 intact     - CN VII  Face strong and symmetric bilaterally     - CN VIII  Hearing intact bilaterally     - CN IX, X  Palate raises midline and symmetric     - CN XI  SCM and trapezius 5/5 bilaterally     - CN XII  Tongue midline   * Motor  Muscle bulk normal, strength 5/5 throughout   * Sensory   Intact to temperature and vibration throughout   * Coordination  No dysmetria with finger-to-nose or heel-to-shin       * Deep tendon reflexes  1+ and symmetric throughout              Significant Labs:   Recent Lab Results     None        All pertinent lab results from the past 24 hours have been reviewed.    Significant Studies: I have reviewed all pertinent imaging results/findings within the past 24 hours.

## 2017-07-22 NOTE — PROGRESS NOTES
[facility]  Neurology-Epilepsy  Progress Note    Patient Name: Earnest Morgan  MRN: [id number]  Admission Date: 7/18/2017  Hospital Length of Stay: 4 days  Code Status: Full Code   Attending Provider: Denny Posey MD  Primary Care Physician: MIKE Caballero   Principal Problem:Complex partial epilepsy with generalization and with intractable epilepsy    Subjective:     Hospital Course:   7/18-7/19- No events overnight. EEG did not show any epileptic discharges.   7/19-7/20- Patient with 3 seizures. 2 overnight and 1 this am. Patient requiring 2 mg ativan overnight and 10 mg total of valium this morning. EEG shows likely coming from R f/t region. Seizure semiology with classic figure of 4 sign. Load with IV vimpat 400 mg and restart vimpat bid. Patient did have post-ictal confusion and was not oriented to place or time.   7/20-7/21- Patient with no events overnight. EEG showing sharp waves on L temporal chains now, with same R temp sharps as seen in prior studies.   7/21-7/22- No events overnight. EEG shows L temporal sharps at times of drowsiness. Home today.     Interval History: no events overnight.     Current Facility-Administered Medications   Medication Dose Route Frequency Provider Last Rate Last Dose    amlodipine tablet 10 mg  10 mg Oral Daily Franca Marshall MD   10 mg at 07/21/17 0845    docusate sodium capsule 100 mg  100 mg Oral BID Franca Marshall MD   100 mg at 07/21/17 0845    ibuprofen tablet 600 mg  600 mg Oral Q6H PRN Franca Marshall MD        lacosamide tablet 200 mg  200 mg Oral BID Franca Marshall MD   200 mg at 07/21/17 2004    ondansetron disintegrating tablet 8 mg  8 mg Oral Q8H PRN Franca Marshall MD        sodium chloride 0.9% flush 3 mL  3 mL Intravenous Q8H Franca Marshall MD   3 mL at 07/22/17 0600     Continuous Infusions:     Review of Systems   Constitutional: Negative.    Eyes: Negative.    Respiratory: Negative.     Cardiovascular: Negative.    Gastrointestinal: Negative.    Endocrine: Negative.    Genitourinary: Negative.         Urinary incontinence    Musculoskeletal: Negative.    Neurological: Positive for dizziness, seizures, syncope and headaches.   Psychiatric/Behavioral: Negative.      Objective:     Vital Signs (Most Recent):  Temp: 97.4 °F (36.3 °C) (07/22/17 0748)  Pulse: 64 (07/22/17 0748)  Resp: 16 (07/22/17 0748)  BP: (!) 133/90 (07/22/17 0748)  SpO2: 98 % (07/22/17 0748) Vital Signs (24h Range):  Temp:  [96.1 °F (35.6 °C)-98.3 °F (36.8 °C)] 97.4 °F (36.3 °C)  Pulse:  [58-71] 64  Resp:  [16-18] 16  SpO2:  [93 %-99 %] 98 %  BP: (128-142)/(71-91) 133/90     Weight: 87.1 kg (192 lb)  Body mass index is 26.04 kg/m².    Physical Exam        Constitutional  Well-developed, well-nourished, appears stated age       Cardiovascular  Radial pulses 2+ and symmetric, no LE edema bilaterally   Neurological    * Mental status      - Orientation  Oriented to person, place, time, and situation     - Memory   Intact recent and remote     - Attention/concentration  Attentive, vigilant during exam     - Language  Naming & repetition intact, +2-step commands     - Fund of knowledge  Aware of current events     - Executive  Well-organized thoughts     - Other     * Cranial nerves       - CN II  PERRL, visual fields full to confrontation     - CN III, IV, VI  Extraocular movements full, normal pursuits and saccades     - CN V  Sensation V1 - V3 intact     - CN VII  Face strong and symmetric bilaterally     - CN VIII  Hearing intact bilaterally     - CN IX, X  Palate raises midline and symmetric     - CN XI  SCM and trapezius 5/5 bilaterally     - CN XII  Tongue midline   * Motor  Muscle bulk normal, strength 5/5 throughout   * Sensory   Intact to temperature and vibration throughout   * Coordination  No dysmetria with finger-to-nose or heel-to-shin       * Deep tendon reflexes  1+ and symmetric throughout              Significant  Labs:   Recent Lab Results     None        All pertinent lab results from the past 24 hours have been reviewed.    Significant Studies: I have reviewed all pertinent imaging results/findings within the past 24 hours.    Assessment and Plan:     Essential hypertension    Continue home amlodipine 10 mg        * Complex partial epilepsy with generalization and with intractable epilepsy    D/c vEEG  Vimpat 200 mg  3T MRI reviewed- no abnormality found. Will likely need a PET scan (setting this up) and possibly a HEDY scan  Seizure precutions  AED levels  Will require a PET scan and HEDY scan done as outpatient.   Outpatient PET and HEDY scans    F/u with Dr. Castrejon in 6 weeks             VTE Risk Mitigation         Ordered     Low Risk of VTE  Once      07/18/17 8577          Franca Marshall MD  Neurology-Epilepsy  [facility]

## 2017-07-22 NOTE — ASSESSMENT & PLAN NOTE
D/c vEEG  Vimpat 200 mg  3T MRI reviewed- no abnormality found. Will likely need a PET scan (setting this up) and possibly a HEDY scan  Seizure precutions  AED levels  Will require a PET scan and HEDY scan done as outpatient.   Outpatient PET and HEDY scans    F/u with Dr. Castrejon in 6 weeks

## 2017-07-22 NOTE — PROCEDURES
Procedures     EPILEPSY MONITORING UNIT  EEG/VIDEO TELEMETRY REPORT  DATE OF SERVICE: 7/21/17-7/22/17  EEG NUMBER: [id number]  REQUESTED BY: Taty  LOCATION OF SERVICE: [facility]    METHODOLOGY      Electroencephalographic (EEG) is recorded with electrodes placed according to the International 10-20 placement system.  Thirty Two (32) channels of digital signal including the T1 and T2 electrodes are simultaneously recorded from the scalp and also including EKG, EMG  and/or eye movement monitors.  Recording band pass was 0.1 to 512 hz.  Digital video recording of the patient is simultaneously recorded with the EEG.  The patient is instructed report clinical symptoms which may occur during the recording session.  EEG and video recording is stored and archived in digital format. Activation procedures which include photic stimulation, hyperventilation and instructing patients to perform simple task are done in selected patients.         The EEG is displayed on a monitor screen and can be reformatted into different montages for evaluation.  The entire recoding is submitted for computer assisted analysis to detect spike and electrographic seizure activity.  The entire recording is visually reviewed and the times identified by computer analysis as being spikes or seizures are reviewed again.  Compresses spectral analysis (CSA) is also performed on the activity recorded from each individual channel.  This is displayed as a power display of frequencies from 0 to 30 Hz over time.   The CSA analysis is done and displayed continuously.  This is reviewed for asymmetries in power between homologous areas of the scalp and for presence of changes in power which canbe seen when seizures occur.  Sections of suspected abnormalities on the CSA is then compared with the original EEG recording.      [employer] software was also utilized in the review of this study.  This software suite analyzes the EEG recording in multiple domains.  Coherence and  rhythmicity is computed to identify EEG sections which may contain organized seizures.  Each channel undergoes analysis to detect presence of spike and sharp waves which have special and morphological characteristic of epileptic activity.  The routine EEG recording is converted from spacial into frequency domain.  This is then displayed comparing homologous areas to identify areas of significant asymmetry.  Algorithm to identify non-cortically generated artifact is used to separate eye movement, EMG and other artifact from the EEG.      Recording Times  Start on 7/21/17 at 07:00:27  Stop on 7/22/17 at 08:53:29  A total of 25 hours of EEG/Video telemetry was recorded.     ELECTROENCEPHALOGRAM  INTERICTAL: The record shows a good  organization at rest, consisting of a 35-55 uV 10-10.5 Hz posterior dominant rhythm with good  reactivity. There is mild bilateral beta activity. There are rare left temporal sharp wave discharges best seen during drowsiness.     Drowsiness is characterized by attenuation of the background, vertex waves, and bilateral theta slowing. Stage II sleep is characterized by slowing, vertex waves, and symmetric sleep spindles. Slow wave and REM sleep are recorded.    EKG recording shows a sinus rhythm.    There is no push button or clinical event.    ICTAL: No electrographic seizures are captured during this portion of the recording.    FINAL SUMMARY  ELECTROENCEPHALOGRAM: Abnormal study due to the presence of focal interictal epileptiform discharges over the LEFT temporal region. This is suggestive of an underlyng focal onset seizure disorder. Please note that in earlier portions of the recording, the patient was noted to have multiple electroclinical seizures with RIGHT hemispheric onset. Please see Dr. Denny Posey's portion of the report for details regarding these findings.      Ayden Shepard MD  Department of Neurology  [facility]

## 2017-07-25 ENCOUNTER — TELEPHONE (OUTPATIENT)
Dept: NEUROLOGY | Facility: CLINIC | Age: 52
End: 2017-07-25

## 2017-07-25 ENCOUNTER — PATIENT OUTREACH (OUTPATIENT)
Dept: ADMINISTRATIVE | Facility: CLINIC | Age: 52
End: 2017-07-25

## 2017-07-25 DIAGNOSIS — G40.219 PARTIAL SYMPTOMATIC EPILEPSY WITH COMPLEX PARTIAL SEIZURES, INTRACTABLE, WITHOUT STATUS EPILEPTICUS: Primary | ICD-10-CM

## 2017-07-25 NOTE — TELEPHONE ENCOUNTER
----- Message from Esha Patel LPN sent at 7/25/2017  2:08 PM CDT -----  C3 nurse spoke with pt for post-discharge follow up call. Patient's wife stated she was told by Dr. Marshall that if approved by insurance her  my be able to receive a scan for further evaluation, please follow up with pt's wife [phone] (H)

## 2017-07-25 NOTE — TELEPHONE ENCOUNTER
"Please forward this important TCC information to your provider in order to maximize the post discharge care delivery of this patient. Patient would like the following question addressed during appointment " What's the next step ?" What city will pt be referred to for further evaluation .       C3 nurse spoke with Earnest Morgan's wife for a TCC post hospital discharge follow up call. The patient has a scheduled HOSFU appointment with Dr. Lucía Abdullahi on 8/2/17 @ 1774.     Respectfully,   Esha Patel LPN   [facility]     [email or web address]       Please do not reply to this message, as this inbox is not routinely monitored.   "

## 2017-07-25 NOTE — PATIENT INSTRUCTIONS
Discharge Instructions for Epilepsy  You have been diagnosed with epilepsy, a disorder of recurring seizures. When you have a seizure, an electrical disturbance happens in your brain. There are different kinds of seizures, and each patient may have one or many types of seizures. Here are some guidelines for you and your family.  If you have a seizure  Ask friends and family members to learn seizure management. Also, tell them to do the following if you have a seizure:  · Clear the area to prevent injury.  · Position you on a flat, carpeted surface, if possible.  · Dont try to restrain you.  · Dont put anything in your mouth.  · Turn you onto your side if you start to vomit.  · Keep track of the date and time the seizure started, how long it lasted, whether or not you lost consciousness, a description of your body movements, what provoked the seizure (if known), and any injuries you suffered. Using a watch may help keep correct time of events.    · Stay with you until you regain consciousness.  · Call 911 if the seizure is longer than 5 minutes, if there are multiple seizures, or if you do not begin to wake up after the seizure stops.    Activities  Following are some things to consider:  · Enjoy your normal activities. Most people with epilepsy lead normal lives.  · Avoid hazardous activities, such as mountain climbing or scuba diving. A seizure under these conditions could lead to a fatal accident.  · Do not swim alone or participate in other similar activities without others nearby.  · Ask your healthcare provider about any restrictions on driving or other activities.  · Check with your state department of public safety to learn whether there are any driving limitations based on your condition.  Other home care  Other considerations:  · Take your medicine exactly as directed. Skipping doses can affect the way your body handles the medicine, which could cause you to have a seizure.  · Dont drink alcohol or use  any medicine without talking with your healthcare provider first.  · Seizure medicines may interact with other medicines. Make sure all of your healthcare providers have a list of all your medicines.   · Birth control pills may not work as effectively when taking seizure medicines. Ask your healthcare provider if a change in birth control is needed.   · Wear a medical alert pendant or bracelet that alerts others to your condition, especially if you are allergic to seizure medicine.  · Join a local support group. Ask your healthcare provider for names and phone numbers.  .  When to seek medical attention  Tell your family members or friends to call 911 right away if you have:  · Seizure that lasts more than 5 minutes  · Multiple seizures in a row  · No recovery of consciousness after the seizure stops  Otherwise, have them call your healthcare provider right away if you have:  · Seizures that are getting longer and worse  · Seizures that are different from those youve had in the past  · Seizures strong enough to cause injury  · Skin rash  · Fever of 100.4°F (38°C) or higher, or as directed by your healthcare provider   Date Last Reviewed: 11/5/2015  © 1229-9958 [employer]. [address] [place] [address], [place] [address]. All rights reserved. This information is not intended as a substitute for professional medical care. Always follow your healthcare professional's instructions.

## 2017-07-25 NOTE — TELEPHONE ENCOUNTER
From reviewing Dr. Posey's and Dr. Shepard' notes, it looks like he has interictal sharps arising independently from each temporal lobe, while the seizures captured were all right temporal in origin.  In addition to the 3T MRI that was ordered and has been completed, it looks like he is scheduled for an interictal PET scan and has been advised to undergo HEDY (which will have to be done in either [place] or [place]).  I'm not sure if the HEDY is already set up.  Please coordinate with Fely.

## 2017-07-27 ENCOUNTER — TELEPHONE (OUTPATIENT)
Dept: NEUROLOGY | Facility: CLINIC | Age: 52
End: 2017-07-27

## 2017-07-27 NOTE — TELEPHONE ENCOUNTER
----- Message from Viridiana Mosley sent at 7/26/2017  1:01 PM CDT -----  Contact: wife,  carlos   Missed call   Call back

## 2017-07-27 NOTE — TELEPHONE ENCOUNTER
Left message for the patient to call.  Spoke with Fely at [facility].  She sent the order for the HEDY to the hospital in [place], [place].

## 2017-08-01 ENCOUNTER — HOSPITAL ENCOUNTER (OUTPATIENT)
Dept: RADIOLOGY | Facility: HOSPITAL | Age: 52
Discharge: HOME | End: 2017-08-01
Attending: PSYCHIATRY & NEUROLOGY
Payer: COMMERCIAL

## 2017-08-01 VITALS — BODY MASS INDEX: 25.4 KG/M2 | WEIGHT: 187.5 LBS | HEIGHT: 72 IN

## 2017-08-01 DIAGNOSIS — G40.219 PARTIAL SYMPTOMATIC EPILEPSY WITH COMPLEX PARTIAL SEIZURES, INTRACTABLE, WITHOUT STATUS EPILEPTICUS: ICD-10-CM

## 2017-08-01 PROCEDURE — 78608 BRAIN IMAGING (PET): CPT | Mod: 26,PS,, | Performed by: RADIOLOGY

## 2017-08-01 PROCEDURE — A9552 F18 FDG: HCPCS

## 2017-08-01 PROCEDURE — 78608 BRAIN IMAGING (PET): CPT | Mod: TC

## 2017-08-02 ENCOUNTER — TELEPHONE (OUTPATIENT)
Dept: NEUROLOGY | Facility: CLINIC | Age: 52
End: 2017-08-02

## 2017-08-02 ENCOUNTER — OFFICE VISIT (OUTPATIENT)
Dept: NEUROLOGY | Facility: CLINIC | Age: 52
End: 2017-08-02
Payer: COMMERCIAL

## 2017-08-02 VITALS
WEIGHT: 192.13 LBS | RESPIRATION RATE: 20 BRPM | HEART RATE: 78 BPM | DIASTOLIC BLOOD PRESSURE: 87 MMHG | BODY MASS INDEX: 26.02 KG/M2 | SYSTOLIC BLOOD PRESSURE: 137 MMHG | HEIGHT: 72 IN

## 2017-08-02 DIAGNOSIS — R51.9 HEADACHE, UNSPECIFIED HEADACHE TYPE: ICD-10-CM

## 2017-08-02 DIAGNOSIS — M85.80 OSTEOPENIA, UNSPECIFIED LOCATION: ICD-10-CM

## 2017-08-02 DIAGNOSIS — G40.219 COMPLEX PARTIAL EPILEPSY WITH GENERALIZATION AND WITH INTRACTABLE EPILEPSY: Primary | ICD-10-CM

## 2017-08-02 PROCEDURE — 99215 OFFICE O/P EST HI 40 MIN: CPT | Mod: S$GLB,,, | Performed by: PSYCHIATRY & NEUROLOGY

## 2017-08-02 PROCEDURE — 99999 PR PBB SHADOW E&M-EST. PATIENT-LVL III: CPT | Mod: PBBFAC,,, | Performed by: PSYCHIATRY & NEUROLOGY

## 2017-08-02 RX ORDER — NAPROXEN SODIUM 220 MG
220 TABLET ORAL 2 TIMES DAILY WITH MEALS
COMMUNITY
End: 2017-08-02

## 2017-08-02 RX ORDER — INDOMETHACIN 50 MG/1
50 CAPSULE ORAL DAILY PRN
Qty: 15 CAPSULE | Refills: 0 | Status: SHIPPED | OUTPATIENT
Start: 2017-08-02 | End: 2018-01-18
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
## 2017-08-14 ENCOUNTER — TELEPHONE (OUTPATIENT)
Dept: NEUROLOGY | Facility: CLINIC | Age: 52
End: 2017-08-14

## 2017-08-14 NOTE — TELEPHONE ENCOUNTER
----- Message from Lili Bishop sent at 8/14/2017  2:36 PM CDT -----  Contact:  call //[phone]//carlos/ wife    Calling to  Speak to   Nurse  About  Scheduling an a  Test  For  [place]

## 2017-08-17 NOTE — TELEPHONE ENCOUNTER
Left message that [facility] would call to schedule the HEDY test once it is approved.  Fely at the EMU is getting the order to [facility].

## 2017-08-22 NOTE — PROCEDURES
DATE OF SERVICE:  07/18/2017    EEG NUMBER:  EMU--1, EMU--2, EMU--3.    REFERRING PHYSICIAN:  Dr. Castrejon.    This EEG was performed to characterize the patient's events.  EPILEPSY MONITORING UNIT  EEG/VIDEO TELEMETRY REPORT    METHODOLOGY:   Electroencephalographic (EEG) is recorded with electrodes placed   according to the International 10-20 placement system.  Thirty Two (32) channels   of digital signal, including T1 and T2 electrodes, are simultaneously recorded   from the scalp and may also include EKG, EMG and/or eye movement monitors.    Recording band pass was 0.1 to 512 Hz.  Digital video recording of the patient   is simultaneously recorded with the EEG.  The patient is instructed to report   clinical symptoms which may occur during the recording session.  EEG and video   recording are stored and archived in digital format. Activation procedures,   which include photic stimulation, hyperventilation and instructing patients to   perform simple tasks, are done in selected patients.    The EEG is displayed on a monitor screen and can be reformatted into different   montages for evaluation.  The entire recoding is submitted for computer-assisted   analysis to detect spike and electrographic seizure activity.  The entire   recording is visually reviewed, and the times identified by computer analysis as   being spikes or seizures are reviewed again.  Compressed spectral analysis   (CSA) is also performed on the activity recorded from each individual channel.    This is displayed as a power display of frequencies from 0 to 30 Hz over time.     The CSA analysis is done and displayed continuously.  This is reviewed for   asymmetries in power between homologous areas of the scalp and for presence of   changes in power which can be seen when seizures occur.  Sections of suspected   abnormalities on the CSA are then compared with the original EEG recording.    [employer] software was also utilized in the  review of this study.  This software   suite analyzes the EEG recording in multiple domains.  Coherence and rhythmicity   are computed to identify EEG sections which may contain organized seizures.    Each channel undergoes analysis to detect presence of spike and sharp waves   which have special and morphological characteristics of epileptic activity.  The   routine EEG recording is converted from special into frequency domain.  This is   then displayed comparing homologous areas to identify areas of significant   asymmetry.  Algorithm to identify non-cortically generated artifact is used to   separate artifact from the EEG.      RECORDING TIMES:  Start on 07/18/2017 at hour 16 minute 4 second 4.  End on 07/19/2017 at hour 6 minute 59 second 59.    Restart on 07/19/2017 at hour 7 minute 0 second 27.  End on 07/20/2017 at hour 7 minute 0 second 0.    Restart on 07/20/2017 at hour 7 minute 0 second 31.  End on 07/21/2017 at hour 7 minute 0 second 0.    The total time of video EEG recording for this study was 61 hours and 44   minutes.    SEIZURES RECORDED:  During this recording, a total of 3 seizures were recorded.  Seizure #1 was recorded on 07/19/2017.  It started at hour 22, minute 31, second   20 and ended at hour 22, minute 32, second 48.  Seizure #2 was recorded on 07/19/2017.  It started at hour 23, minute 29, second   36 and ended at hour 23, minute 31, second 4.  Seizure #3 was recorded on 07/20/2017.  It started at hour 08, minute 0, second   28 and ended at hour 08, minute 2, second 22.    ELECTROENCEPHALOGRAM  Interictal:  The recording was obtained with a number of standard bipolar and   referential montages during wakefulness, drowsiness and sleep.  In the alert   state, the posterior background rhythm was a symmetric, well-modulated 9-Hz   alpha rhythm, which reacted symmetrically to eye opening.  Bursts of generalized   mixed theta range activity were noted during wakefulness.  Intermittent photic    stimulation evoked symmetric posterior driving responses.  Hyperventilation   produced physiological slowing.  No abnormalities were activated with photic   stimulation or hyperventilation.  Independent left and right frontotemporal   focal polymorphic delta and theta range slowing were noted.  In addition,   independent left and right temporal maximum sharp waves were noted at F8, T4,   and F7, T3 respectively.  The overall burden of focal slowing and discharges was   greater on the right when compared to the left.  During drowsiness, the   background rhythm waxed and waned, and there were periods of slowing.  During   stage II sleep, symmetric V waves, K complexes and sleep spindles were noted.    The EKG channel revealed sinus rhythm.    Ictal:  During this recording, a total of 3 seizures were recorded.    Seizure #1 was recorded during wakefulness.  The onset was characterized by   buildup of rhythmical mixed theta range activity in the right hemisphere, which   was poorly localized.  The evolution was maximal in the temporal region with   involvement of the parasagittal and central channels.  The activity evolved in   morphology and frequency to a rhythmical theta range activity with overriding   fast activity.  The activity gradually spread to the contralateral hemisphere   and involved rhythmical alpha and theta range activity with subsequent secondary   generalization.  The offset was characterized by diffuse suppression.  Seizure #2 presented similarly to seizure #1.  The offset was characterized by   diffuse suppression with subsequent greater degree of suppression over the right   hemisphere.  Seizure #3 presented electrographically similar to seizure #1.    CLINICAL DESCRIPTION OF THE SEIZURES:  Seizure #1 was recorded during wakefulness.  The patient had orofacial   automatisms approximately 9 seconds after the electrographic onset of the   seizure.  This was followed by left upper extremity  automatisms followed by left   version followed by secondary generalized tonic-clonic seizure involving   figure-of-four presentation with extension of the left upper extremity.  Seizure #2 occurred during wakefulness and presented similar to seizure #1.  The   onset of the orofacial automatisms was approximately 4 seconds after the   electrographic onset of seizure.  The patient also had intermittent head nodding   and was able to speak until approximately 20 minutes after the electrographic   onset of the seizure.  Seizure #3 presented clinically similar to seizure #1.    FINAL SUMMARY:  ELECTROENCEPHALOGRAM:  Interictal:  This is an abnormal EEG during wakefulness, drowsiness and sleep.    Independent left and right frontotemporal focal slowing was noted.  Independent   left and right frontotemporal maximum sharp waves were noted, more frequent in   the right hemisphere.    Ictal:  During this recording, a total of 3 electrographic seizures were   recorded which emanated from the right frontotemporal region.    CLINICAL SEIZURE:  Classification:  Focal seizures.  Localization:  The seizures were poorly localized with maximal activity in the   frontotemporal region.  Lateralization:  Right hemisphere.    CLINICAL CORRELATION:  The patient is a 52-year-old male with a history of   epilepsy who presented to the Epilepsy Monitoring Unit to characterize his   seizures.  His home antiepileptic medications were held during this study.    Activation procedures including photic stimulation and hyperventilation were   performed during this study.      FAK/HN  dd: 08/22/2017 12:12:23 (CDT)  td: 08/22/2017 13:15:11 (CDT)  Doc ID   #[id number]  Job ID #[id number]    CC:

## 2017-10-09 ENCOUNTER — TELEPHONE (OUTPATIENT)
Dept: NEUROLOGY | Facility: CLINIC | Age: 52
End: 2017-10-09

## 2017-10-09 NOTE — TELEPHONE ENCOUNTER
Left message for Ashlyn, pts wife, to call me directly to discuss [facility] HEDY orders and needing additional information.

## 2017-10-17 ENCOUNTER — TELEPHONE (OUTPATIENT)
Dept: NEUROLOGY | Facility: CLINIC | Age: 52
End: 2017-10-17

## 2017-10-17 NOTE — TELEPHONE ENCOUNTER
----- Message from Aaron Hutson sent at 10/17/2017 11:07 AM CDT -----  Contact: Wife - Ashlyn Morgan  States that they are trying to get a test scheduled in [place] and has questions about it.  Can you please call Ashlyn back at [phone].  Thank you

## 2017-10-17 NOTE — TELEPHONE ENCOUNTER
Spoke to Ashlyn, pts wife, and advised per [facility] additional notes from previous neurologist noting AED's in past must be shown as [employer] is not authorizing HEDY scan. Ashlyn is aware and will attempt to obtain pts hx from past physicians.

## 2017-10-25 ENCOUNTER — TELEPHONE (OUTPATIENT)
Dept: NEUROLOGY | Facility: CLINIC | Age: 52
End: 2017-10-25

## 2017-10-25 NOTE — TELEPHONE ENCOUNTER
Left message that all the records from [facility] have been sent to [facility] and that they need to contract prior Neurologist and have them send his records to [facility].

## 2017-10-25 NOTE — TELEPHONE ENCOUNTER
----- Message from Shara Conn sent at 10/24/2017  1:49 PM CDT -----  Contact: wife, Ashlyn Morgan  Patient's wife, Ashlyn Morgan left previous message regarding prescription records. The insurance needs documentation that patient has tried other medications. Patient is having test in [place] and needs a prior authorization.  Please call patient's wife at [phone]. Thanks!

## 2017-12-01 ENCOUNTER — TELEPHONE (OUTPATIENT)
Dept: NEUROLOGY | Facility: CLINIC | Age: 52
End: 2017-12-01

## 2017-12-01 NOTE — TELEPHONE ENCOUNTER
----- Message from Patricia Preston sent at 11/29/2017 10:33 AM CST -----  Contact: wife Ashlyn [phone]  Patient's wife called and asked for a call back about his MRI of his head the 3 T the test was done in February  Wife states you ordered the MRI she has not had any luck in getting a copy of the MRI. Please call back for details.

## 2017-12-05 RX ORDER — LACOSAMIDE 200 MG/1
200 TABLET ORAL 2 TIMES DAILY
Qty: 60 TABLET | Refills: 6 | Status: SHIPPED | OUTPATIENT
Start: 2017-12-05 | End: 2018-01-09 | Stop reason: SDUPTHER

## 2018-01-09 RX ORDER — LACOSAMIDE 200 MG/1
200 TABLET ORAL 2 TIMES DAILY
Qty: 60 TABLET | Refills: 6 | Status: SHIPPED | OUTPATIENT
Start: 2018-01-09 | End: 2018-01-18 | Stop reason: SDUPTHER

## 2018-01-09 NOTE — TELEPHONE ENCOUNTER
Please call in refill.    Not sure if we have gotten the HEDY results.  I don't see it under Media.  Call Fely.

## 2018-01-09 NOTE — TELEPHONE ENCOUNTER
----- Message from Dali Yonatan sent at 1/9/2018 10:49 AM CST -----  Patients wife states that patient had 3 seizures last night and need to see the doctor as soon as possible.  Call Ashlyn at [phone].

## 2018-01-09 NOTE — TELEPHONE ENCOUNTER
The patient had three seizures yesterday, two were more like spells, that he came out of quickly.  The last seizure he was dazed and confused. He started making the sound he makes before a grand mal.  He was incontinent of urine.  It lasted about 20 minutes.  His wife started talking to him before he went into a full blown seizure.  He did missed one dose of the Vimpat last night. He now has the medication.  Please advise.

## 2018-01-09 NOTE — TELEPHONE ENCOUNTER
----- Message from Glendy Barahona sent at 1/8/2018  4:36 PM CST -----  Contact: [phone]  Patient requesting a refill on seizure medication.    Patient will be using   [facility] - [place], [place] - [address] [address]  [facility] [address]  Phone: [phone] Fax: [phone]    Please call patient at [phone]. Thanks!

## 2018-01-18 ENCOUNTER — OFFICE VISIT (OUTPATIENT)
Dept: NEUROLOGY | Facility: CLINIC | Age: 53
End: 2018-01-18
Payer: COMMERCIAL

## 2018-01-18 VITALS
RESPIRATION RATE: 18 BRPM | DIASTOLIC BLOOD PRESSURE: 77 MMHG | SYSTOLIC BLOOD PRESSURE: 134 MMHG | HEIGHT: 72 IN | WEIGHT: 195 LBS | BODY MASS INDEX: 26.41 KG/M2 | HEART RATE: 79 BPM

## 2018-01-18 DIAGNOSIS — G40.219 COMPLEX PARTIAL EPILEPSY WITH GENERALIZATION AND WITH INTRACTABLE EPILEPSY: Primary | ICD-10-CM

## 2018-01-18 PROCEDURE — 99999 PR PBB SHADOW E&M-EST. PATIENT-LVL IV: CPT | Mod: PBBFAC,,, | Performed by: PSYCHIATRY & NEUROLOGY

## 2018-01-18 PROCEDURE — 99215 OFFICE O/P EST HI 40 MIN: CPT | Mod: S$GLB,,, | Performed by: PSYCHIATRY & NEUROLOGY

## 2018-01-18 RX ORDER — LAMOTRIGINE 25 MG/1
TABLET ORAL
Qty: 120 TABLET | Refills: 2 | Status: SHIPPED | OUTPATIENT
Start: 2018-01-18 | End: 2018-04-18 | Stop reason: SDUPTHER

## 2018-01-18 RX ORDER — PANTOPRAZOLE SODIUM 20 MG/1
TABLET, DELAYED RELEASE ORAL
Refills: 3 | COMMUNITY
Start: 2017-12-04 | End: 2018-08-22

## 2018-01-18 RX ORDER — LACOSAMIDE 200 MG/1
200 TABLET ORAL 2 TIMES DAILY
Qty: 60 TABLET | Refills: 6 | Status: SHIPPED | OUTPATIENT
Start: 2018-01-18 | End: 2018-04-30 | Stop reason: SDUPTHER
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
## 2018-02-16 ENCOUNTER — TELEPHONE (OUTPATIENT)
Dept: NEUROLOGY | Facility: CLINIC | Age: 53
End: 2018-02-16

## 2018-02-16 NOTE — TELEPHONE ENCOUNTER
----- Message from Shara Conn sent at 2/16/2018  9:43 AM CST -----  Contact: wife, Collette Rowell  Patient's wife, Collette Rowell is returning Sheri's call regarding rescheduling appointment. After I rescheduled appointment to 04/30/17 patient's wife states it should be with a Dr Novoa.  Please call patient's wife at [phone].  Thanks!

## 2018-02-28 ENCOUNTER — TELEPHONE (OUTPATIENT)
Dept: NEUROLOGY | Facility: CLINIC | Age: 53
End: 2018-02-28

## 2018-02-28 NOTE — TELEPHONE ENCOUNTER
----- Message from Sonya Dougherty sent at 2/28/2018  2:07 PM CST -----  Contact: Ashlyn  Ashlyn patient's spouse called stated that no one called regarding setting up appointment to see the neuropsychology order is in the system call back at [phone]

## 2018-03-01 ENCOUNTER — TELEPHONE (OUTPATIENT)
Dept: NEUROLOGY | Facility: CLINIC | Age: 53
End: 2018-03-01

## 2018-03-01 NOTE — TELEPHONE ENCOUNTER
Patient's wife informed that the neuro psych testing needs to be done by Dr. Christopher or Dr. Gudino. She verbalized understanding. Given phone number to schedule.

## 2018-04-18 DIAGNOSIS — G40.219 COMPLEX PARTIAL EPILEPSY WITH GENERALIZATION AND WITH INTRACTABLE EPILEPSY: ICD-10-CM

## 2018-04-18 RX ORDER — LAMOTRIGINE 25 MG/1
TABLET ORAL
Qty: 120 TABLET | Refills: 2 | Status: SHIPPED | OUTPATIENT
Start: 2018-04-18 | End: 2018-04-30 | Stop reason: SDUPTHER

## 2018-04-30 DIAGNOSIS — G40.219 COMPLEX PARTIAL EPILEPSY WITH GENERALIZATION AND WITH INTRACTABLE EPILEPSY: ICD-10-CM

## 2018-05-01 RX ORDER — LAMOTRIGINE 25 MG/1
50 TABLET ORAL 2 TIMES DAILY
Qty: 120 TABLET | Refills: 2 | Status: SHIPPED | OUTPATIENT
Start: 2018-05-01 | End: 2018-07-06 | Stop reason: SDUPTHER

## 2018-05-01 RX ORDER — LACOSAMIDE 200 MG/1
200 TABLET ORAL 2 TIMES DAILY
Qty: 60 TABLET | Refills: 1 | Status: SHIPPED | OUTPATIENT
Start: 2018-05-01 | End: 2019-01-28 | Stop reason: SDUPTHER

## 2018-05-23 ENCOUNTER — INITIAL CONSULT (OUTPATIENT)
Dept: NEUROLOGY | Facility: CLINIC | Age: 53
End: 2018-05-23
Payer: COMMERCIAL

## 2018-05-23 DIAGNOSIS — R41.3 MEMORY CHANGE: ICD-10-CM

## 2018-05-23 DIAGNOSIS — F33.2 SEVERE EPISODE OF RECURRENT MAJOR DEPRESSIVE DISORDER, WITHOUT PSYCHOTIC FEATURES: ICD-10-CM

## 2018-05-23 DIAGNOSIS — G40.219 COMPLEX PARTIAL EPILEPSY WITH GENERALIZATION AND WITH INTRACTABLE EPILEPSY: ICD-10-CM

## 2018-05-23 DIAGNOSIS — F41.1 GENERALIZED ANXIETY DISORDER: ICD-10-CM

## 2018-05-23 PROCEDURE — 90791 PSYCH DIAGNOSTIC EVALUATION: CPT | Mod: S$GLB,,, | Performed by: CLINICAL NEUROPSYCHOLOGIST

## 2018-05-23 PROCEDURE — 96118 PR NEUROPSYCH TESTING BY PSYCH/PHYS: CPT | Mod: S$GLB,,, | Performed by: CLINICAL NEUROPSYCHOLOGIST

## 2018-05-23 PROCEDURE — 99499 UNLISTED E&M SERVICE: CPT | Mod: S$GLB,,, | Performed by: CLINICAL NEUROPSYCHOLOGIST

## 2018-05-23 NOTE — PROGRESS NOTES
Outpatient Neuropsychological Evaluation    Referral Information  Name: Earnest Morgan  MRN: [id number]  SERRA: 2018  : 1965  Age: 53 y.o.  Handedness: Right  Race: White  Gender: male  Referring Provider: Rashaad Castrejon Jr., Md  [address] [place] [address]  Referral Reason/Medical Necessity: Cognitive difficulties in the context of treatment refractory epilepsy with neuropsychological evaluation ordered by Neurology to characterize cognitive status, differential diagnosis, update treatment recommendations, and evaluate considerations prior to possible epilepsy surgery.   Billing:Total licensed neuropsychologists professional time includes: clinical interview (78885: 60-minutes), test administration and interpretation of tests administered by the billing neuropsychologist, integration of test results and other clinical data, preparing the final report, and personally reporting results to the patient ([id number])= 5 hours.   Consent: The patient expressed an understanding of the purpose of the evaluation and consented to all procedures.    HPI  Mr. Morgan has treatment refractory epilepsy (see details below), depression, anxiety, and cognitive difficulties.     Current Symptoms  Cognitive Sxs:  · Attention:   · Per Pt: Agreed with wife.   · Per Wife: Some trouble following/staying focused in conversations. Occasionally, responses will be unrelated to conversation because he is not paying attention.   · Mental Speed: No reported trouble  · Memory:   · Per Pt: Agreed with wife  · Per Wife: More progressive decline for short-term memory in the past 3 years with a more noticeable decline in the past year.   · Language:  · Per Pt: Intermittent difficulty with pronunciation, slurring, and finding the right words. No trouble with reading.   · Per Wife: Agreed with above.   · Visuospatial/Perceptual:  · No difficulties  · Executive Functioning:  · Per Wife: Some trouble with reasoning and problem solving  such that she increasingly makes more decisions and he often defers to her for making decisions.     [Onset/Course]: Wife has noticed more trouble with cognition (see above) in the past 3-years with a more noticeable decline in the past year. Contextually, the patient's epilepsy has been poorly controlled, he is on more AEDs, and psychiatric sxs have worsened.     Neuropsychiatric Sxs:  · Mood:   · Depression:   · Pt: He reported no mood difficulties. However, this is in parmar contrast to his report on a self-report measure where he reported moderate levels of depression.   · Wife: Reported that he seems much more withdrawn, anhedonic, and irritable. She feels as though he overly limits his activities due to fear of having seizures.   · Anxiety:  · Pt: Quite a bit of worry now about seizures happening and has limited his activities because of it.  · Wife: Agreed with above   · Other:  None  · Neurovegetative:  · Sleep: Normal  · Appetite: Normal  · Energy: Lower  · Behavioral Concerns: None  · Delusions/Hallucinations: None  · SI/HI: Denied    Physical Sxs:  · Motor: Mild sway with his walk.   · Sensory:   · Some eye trouble with no optometry visit  · Hearing is okay without distractions. With distractions, he asks for more repetition.   · Pain: None    Current Functioning (I/ADLs):  · ADLs: Independent  · IADLs: Independent  · Finances: Wife has managed long term  · Medication Mgmt: No problems with management  · Driving: Very little due to seizures  · Household Mgmt: Has some chores around the house      Family History   Problem Relation Age of Onset    Cancer Mother         lung    Diabetes Mother     Heart attack Father     Diabetes Father     Stroke Sister     Diabetes Brother     Diabetes Sister      Family Neurologic History: Negative for heritable risk factors  Family Psychiatric History: Negative for heritable risk factors    Developmental/Academic Hx:  Developmental: No gestational or later  "developmental concerns.  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  We do have to carefully consider the relevance of the interictal left temporal epileptiform transients, though."  · Stroke: None  · Movement Disorder:     Lab Results   Component Value Date    HJRBXFCY13 1043 (H) 05/27/2016     Lab Results   Component Value Date    RPR Reactive (A) 02/04/2017     Lab Results   Component Value Date    FOLATE 6.5 05/27/2016     Lab Results   Component Value Date    TSH 2.195 05/27/2016     Lab Results   Component Value Date    HGBA1C 4.7 05/27/2016     No results found for: HIV1X2, [id number]    Neurodiagnostics [per Neurology note]  MRI brain (2/17):  No acute intracranial process.  No seizure focus evident.  I independently visualized and interpreted this study.     PET brain (8/17):  "No seizure focus " "localized."     vEEG (7/17):  "Interictal:  This is an abnormal EEG during wakefulness, drowsiness and sleep.    Independent left and right frontotemporal focal slowing was noted.  Independent   left and right frontotemporal maximum sharp waves were noted, more frequent in   the right hemisphere.  Ictal:  During this recording, a total of 3 electrographic seizures were   recorded which emanated from the right frontotemporal region.  CLINICAL SEIZURE:  Classification:  Focal seizures.  Localization:  The seizures were poorly localized with maximal activity in the   frontotemporal region.  Lateralization:  Right hemisphere."     HEDY (11/17):  "Impression: This epilepsy MSI exam localized bitemporal spike sources. 17 spike sources were localized the right anterior and mesial temporal lobe with a horizonital orientation, corresponding to right anterior temporal EEG sharp waves. Four spike sources were localized to the left temporal lobe with vertical orientation, and corresponding to EEG left temporal sharp waves with less convincing epileptiform morphology. Both types of spike sources are commonly seen in association with mesial temporal lobe epilepsy."      Current Outpatient Prescriptions:     amlodipine (NORVASC) 10 MG tablet, Take 10 mg by mouth once daily., Disp: , Rfl:     CYANOCOBALAMIN, VITAMIN B-12, (VITAMIN B-12 ORAL), Take 500 mcg by mouth once daily., Disp: , Rfl:     lacosamide (VIMPAT) 200 mg Tab tablet, Take 1 tablet (200 mg total) by mouth 2 (two) times daily., Disp: 60 tablet, Rfl: 1    lamoTRIgine (LAMICTAL) 25 MG tablet, Take 2 tablets (50 mg total) by mouth 2 (two) times daily., Disp: 120 tablet, Rfl: 2    pantoprazole (PROTONIX) 20 MG tablet, , Disp: , Rfl: 3    Psychiatric Hx:  · Childhood: None  · Adult: None  · Substance Use:  None    Social History     Social History Main Topics    Smoking status: Never Smoker    Smokeless tobacco: Never Used    Alcohol use No    Drug use: No    " "Sexual activity: Not on file       MENTAL STATUS AND OBSERVATIONS:  APPEARANCE: Casually dressed and adequate grooming/hygiene.   ALERTNESS/ORIENTATION: Attentive and alert. Fully oriented (x5) to time and place  GAIT: Slight sway when walking  MOTOR MOVEMENTS/MANNERISMS: Unremarkable  SPEECH/LANGUAGE: Normal in rhythm, tone. Volume slightly low. Rate slow. Speech was mildly dysarthric and he had mild to moderate word finding difficulty noted. Expressive and receptive language was normal.  STATED MOOD/AFFECT: The patients stated mood was "okay." Affect was seemed restricted. With further conversation after clinical rapport was built, he acknowledged more depression and anxiety than initially disclosed.    INTERPERSONAL BEHAVIOR: Rapport was quickly and easily established   SUICIDALITY/HOMICIDALITY: Denied  HALLUCINATIONS/DELUSIONS: None evidenced or endorsed  THOUGHT PROCESSES: Thoughts seemed logical and goal-directed.   TEST TAKING BEHAVIOR and VALIDITY: Freestanding and embedded performance validity measures and observation of effort were suggestive of inadequate engagement. Observationally, he gave up early on many tasks, negatively predicted his performance, reported having difficulty with very easy measures, performed better on some harder measures and worse on easier measures, was quite haphazard on a few tasks (VR Copy) and required redirection (to good effect). Additionally, findings below are discrepant from his independent daily functioning and history. The current results, therefore, are NOT likely a valid reflection of the patient's current functioning. Specifically, with improved effort and reduced anxiety/depression, it is likely that below test scores would be higher. As a result, findings below will be only cautiously interpreted and cannot represent a fully accurate description of his cognitive functioning.    PROCEDURES/TESTS ADMINISTERED:  In addition to performing a review of pertinent medical " records, reviewing limits to confidentiality, conducting a clinical interview, and explaining procedures, the following measures were administered: [employer] (ACS) Test of Pre-Morbid Functioning (TOPF), DCT, Wechsler Adult Intelligence Scale-Fourth Edition (WAIS-IV Digit Span, Arithmetic, Symbol Search, Coding, Visual Puzzles, Information, Vocabulary, Matrix Reasoning, and Similarities), Trail Making Test (TMT-A&B; Sanjeev, et al., 2004), Verbal Fluency Test(FAS/Animals; Sanjeev et al., 2004), [place] Naming Test (BNT; Sanjeev et al., 2004), Ayaan Complex Figure Copy Trial(Ayaan CFT), [place] Verbal Learning Test-Second Edition (CVLT-2), Wechsler Memory Scale-Fourth Edition (WMS-IV) Logical Memory and Visual Reproduction subtests, Grooved Pegboard (GPT; Sanjeev, et al., 2004), BDI-2/GOLDIE-7   Manual norms were used unless otherwise indicated.      TEST RESULTS  PERFORMANCE VALIDITY  RDS...................................7 / Borderline  CVLT-FC..............................11 / Invalid  DCT..................................13 / At Cutoff  Note: 6 errors on DCT        Percentile Interpretation:        </=3rd......................................Abnormal        4th-9th.....................................Borderline Abnormal        10th-24th...................................Low Average        25th-74th...................................Average        75th-90th...................................High Average        91st-97th...................................Superior        >/=97th.....................................Very Superior         ESTIMATED FSIQ and READING LEVEL:      ACS-TOPF (Raw/SS/%ile)..............22 / 80/9th  WAIS-IV Information (SS/%ile)........3 / 1st    INTELLECTUAL FUNCTIONING:    WAIS-IV (scaled score/percentile):    Similarities.........................4 / 2nd  Vocabulary...........................4 / 2nd  Information..........................3 / 1st    Matrix  Reasoning.....................5 / 5th  Visual Puzzles.......................6 / 9th    Digit Span...........................3 / 1st  Arithmetic...........................5 / 5th    Symbol Search........................4 / 2nd  Digit Symbol-Coding..................4 / 2nd          Verbal Comprehension Index..........63 / 1st  Perceptual Reasoning Index..........75 / 5th  Working Memory Index................66 / 1st  Processing Speed Index..............68 / 2nd  Full Scale IQ.......................63 / 1st  General Ability Index...............67 / 1st      AUDITORY ATTENTION AND WORKING MEMORY    [id number] Span        Forward raw..........................8 / 16th      Forward span.........................5 /       Backward raw.........................4 / 5th      Backward span........................2 /       Sequencing raw.......................2 / 1st      Sequencing span......................2 /       Overall (SS/percentile)..............3 / 1st          [id number]        Total Raw............................8 / 5th  WAIS-Working Memory Index (SS/%ile).....66 / 1st      MOTOR AND ORAL PROCESSING SPEED     Trail Making Test (sec. to completion/percentile):        Part A ................................38 / 34th          Errors..............................0 /        WAIS-IV Processing Speed (SS/%ile)        Symbol Search...............................4 / 2nd      Digit Symbol-Coding.........................4 / 2nd      Processing Speed Index......................68 / 2nd      MOTOR FUNCTIONS    Grooved Pegboard (sec. to completion/%ile)        Dominant (Right) Hand.......................139 / <1st      Non-dominant (Left) Hand....................119 / 2nd      LANGUAGE FUNCTIONING    WORD PRODUCTIVITY    Verbal Fluency Tests (raw/percentiles):        FAS.........................................12 / <1st      Animals.....................................10 / 1st      CONFRONTATION NAMING    [place] Naming  Test (raw/percentile)        Total Spontaneous (max. = 60)...............34/ 1st      CONSTRUCTIONAL PRAXIS     Ayaan Complex Figure (raw score/percentile):        Copy (max. = 36).............................23 / <1st  WMS-IV Visual Reproduction (SS/%ile)        VR-Copy......................................40 / 10-16th  Note: Very haphazard and sloppy copy. Redirected to copy neater and he did for one.     NONVERBAL LEARNING AND MEMORY          WMS-IV Visual Reproduction (SS/%ile)        VR-I.........................................1 / <1st      VR-II........................................4 / 2nd      VR-Recognition...............................3 / 3-9th      VR-Copy......................................40 / 10-16th      VERBAL LEARNING AND MEMORY OF A WORDLIST WITH INTERFERENCE    CVLT-2 (raw score/percentile):        Total Recall (5, 5, 8, 7, 9)................34 / 16th      Short Delay Free Recall......................7 / 31st      Short Delay Cued Recall......................6 / 7th      Long Delay Free Recall ......................5 / 16th      Long Delay Cued Recall.......................7 / 16th      Recognition:             Hits.....................................10 / 1st          False-Positives..........................4 /           Total Recognition Discriminability.......1.5 / 7th      VERBAL LEARNING AND MEMORY OF PROSE PASSAGES    WMS-IV (raw score/percentile):        Logical Memory I.............................25 / 2nd      Logical Memory II............................23 / 1st      Recognition..................................21 / 10-16th      EXECUTIVE FUNCTIONING          Trail Making Test (sec. to completion/%ile):        Part B ......................................210 / 4th          Errors...................................4 /   See other measures above for other Executive Functioning Measures    SELF-REPORTED MOOD  BDI-2...........................................25 / Mod to  Severe  GOLDIE-7...........................................15 / Mod to Severe      OVERALL SUMMARY  Unfortunately, results from testing were not interpretable due to variable/inconsistent effort during the assessment. Specifically, neuropsychological tests require that an individual put forth sufficient cognitive effort when taking cognitive tests. If an individual does not put forth adequate effort, then they can appear more cognitively impaired than is actually the case (e.g., with better effort their test scores would be higher). In order to assess for effort, Performance Validity Tests (PVTs) are administered within a traditional battery of cognitive tests to examine a patient's level of effort exerted and/or response style. PVTs do not correspond to cognitive impairment, but are very reliable indicators of effort/response style during cognitive testing. If an individual performs in the invalid range on PVTs, then it suggests that a patient had difficulty fully engaging in testing. The PVTs administered during this patient's exam were largely invalid and suggested that the patient had difficulty remaining focused/attentive/effortful during the exam. This mirrored behavioral observations of trouble remaining cognitively engaged in testing, as well. As a result, I am unable to determine with any certainty the presence or severity of cognitive impairment. This also limits my ability to determine any lateralization/focality of cognitive impairment prior to potential epilepsy surgery.    Results were discussed with Mr. Morgan and his wife. They agreed that he needed to address his depression and anxiety more intensively now. Afterward, follow up testing could be conducted to assess his true cognitive functioning when would be able to engage in testing better. His wife will ensure that he addresses this. I provided referrals in the community and they were appreciative of the feedback and will follow up with Dr. Castrejon. Of  note, patient is RPR positive and its unclear if he has prior syphillis. Recommend follow-up by his primary care providers if not already performed. Additionally, recommend eye exam and audiology exam given report of visual/hearing difficulties.     Referral Dx:  G40.219 (ICD-10-CM) - Complex partial epilepsy with generalization and with intractable epilepsy  Memory Loss    Consult Dx:  Moderate to Severe Depression  Generalized Anxiety Disorder  R/O History of Learning Disability    XANDER Gudino II, Ph.D., ABPP-CN  Board Certified Clinical Neuropsychologist  Co-Director, Cognitive Disorders and Brain Health Program  Department of Neurology and Neurosciences  [facility]    RECOMMENDATIONS/TREATMENT PLAN  See above

## 2018-05-25 ENCOUNTER — TELEPHONE (OUTPATIENT)
Dept: NEUROLOGY | Facility: HOSPITAL | Age: 53
End: 2018-05-25

## 2018-06-14 ENCOUNTER — OFFICE VISIT (OUTPATIENT)
Dept: NEUROLOGY | Facility: CLINIC | Age: 53
End: 2018-06-14
Payer: COMMERCIAL

## 2018-06-14 VITALS
RESPIRATION RATE: 17 BRPM | HEART RATE: 64 BPM | SYSTOLIC BLOOD PRESSURE: 146 MMHG | HEIGHT: 72 IN | DIASTOLIC BLOOD PRESSURE: 86 MMHG | WEIGHT: 194 LBS | BODY MASS INDEX: 26.28 KG/M2

## 2018-06-14 DIAGNOSIS — F32.A DEPRESSION, UNSPECIFIED DEPRESSION TYPE: ICD-10-CM

## 2018-06-14 DIAGNOSIS — F41.9 ANXIETY: ICD-10-CM

## 2018-06-14 DIAGNOSIS — M85.80 OSTEOPENIA, UNSPECIFIED LOCATION: ICD-10-CM

## 2018-06-14 DIAGNOSIS — G40.219 COMPLEX PARTIAL EPILEPSY WITH GENERALIZATION AND WITH INTRACTABLE EPILEPSY: Primary | ICD-10-CM

## 2018-06-14 DIAGNOSIS — A53.0 POSITIVE RPR TEST: ICD-10-CM

## 2018-06-14 PROCEDURE — 3077F SYST BP >= 140 MM HG: CPT | Mod: CPTII,S$GLB,, | Performed by: PSYCHIATRY & NEUROLOGY

## 2018-06-14 PROCEDURE — 99999 PR PBB SHADOW E&M-EST. PATIENT-LVL III: CPT | Mod: PBBFAC,,, | Performed by: PSYCHIATRY & NEUROLOGY

## 2018-06-14 PROCEDURE — 3008F BODY MASS INDEX DOCD: CPT | Mod: CPTII,S$GLB,, | Performed by: PSYCHIATRY & NEUROLOGY

## 2018-06-14 PROCEDURE — 3079F DIAST BP 80-89 MM HG: CPT | Mod: CPTII,S$GLB,, | Performed by: PSYCHIATRY & NEUROLOGY

## 2018-06-14 PROCEDURE — 99215 OFFICE O/P EST HI 40 MIN: CPT | Mod: S$GLB,,, | Performed by: PSYCHIATRY & NEUROLOGY
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
## 2018-06-23 ENCOUNTER — LAB VISIT (OUTPATIENT)
Dept: LAB | Facility: HOSPITAL | Age: 53
End: 2018-06-23
Attending: PSYCHIATRY & NEUROLOGY
Payer: COMMERCIAL

## 2018-06-23 DIAGNOSIS — G40.219 COMPLEX PARTIAL EPILEPSY WITH GENERALIZATION AND WITH INTRACTABLE EPILEPSY: ICD-10-CM

## 2018-06-23 LAB
ALBUMIN SERPL BCP-MCNC: 4.3 G/DL
ALP SERPL-CCNC: 97 U/L
ALT SERPL W/O P-5'-P-CCNC: 33 U/L
ANION GAP SERPL CALC-SCNC: 7 MMOL/L
AST SERPL-CCNC: 21 U/L
BASOPHILS # BLD AUTO: 0.03 K/UL
BASOPHILS NFR BLD: 0.5 %
BILIRUB SERPL-MCNC: 0.5 MG/DL
BUN SERPL-MCNC: 25 MG/DL
CALCIUM SERPL-MCNC: 9.2 MG/DL
CHLORIDE SERPL-SCNC: 110 MMOL/L
CO2 SERPL-SCNC: 21 MMOL/L
CREAT SERPL-MCNC: 1.9 MG/DL
DIFFERENTIAL METHOD: ABNORMAL
EOSINOPHIL # BLD AUTO: 0.1 K/UL
EOSINOPHIL NFR BLD: 0.8 %
ERYTHROCYTE [DISTWIDTH] IN BLOOD BY AUTOMATED COUNT: 13.2 %
EST. GFR  (AFRICAN AMERICAN): 45.8 ML/MIN/1.73 M^2
EST. GFR  (NON AFRICAN AMERICAN): 39.7 ML/MIN/1.73 M^2
GLUCOSE SERPL-MCNC: 96 MG/DL
HCT VFR BLD AUTO: 39.6 %
HGB BLD-MCNC: 12.4 G/DL
IMM GRANULOCYTES # BLD AUTO: 0.03 K/UL
IMM GRANULOCYTES NFR BLD AUTO: 0.5 %
LYMPHOCYTES # BLD AUTO: 1.4 K/UL
LYMPHOCYTES NFR BLD: 23 %
MCH RBC QN AUTO: 28.5 PG
MCHC RBC AUTO-ENTMCNC: 31.3 G/DL
MCV RBC AUTO: 91 FL
MONOCYTES # BLD AUTO: 0.5 K/UL
MONOCYTES NFR BLD: 8.2 %
NEUTROPHILS # BLD AUTO: 4 K/UL
NEUTROPHILS NFR BLD: 67 %
NRBC BLD-RTO: 0 /100 WBC
PLATELET # BLD AUTO: 169 K/UL
PMV BLD AUTO: 11.1 FL
POTASSIUM SERPL-SCNC: 4.9 MMOL/L
PROT SERPL-MCNC: 7.5 G/DL
RBC # BLD AUTO: 4.35 M/UL
SODIUM SERPL-SCNC: 138 MMOL/L
WBC # BLD AUTO: 5.95 K/UL

## 2018-06-23 PROCEDURE — 36415 COLL VENOUS BLD VENIPUNCTURE: CPT | Mod: PO

## 2018-06-23 PROCEDURE — 80053 COMPREHEN METABOLIC PANEL: CPT

## 2018-06-23 PROCEDURE — 80175 DRUG SCREEN QUAN LAMOTRIGINE: CPT

## 2018-06-23 PROCEDURE — 85025 COMPLETE CBC W/AUTO DIFF WBC: CPT

## 2018-06-25 ENCOUNTER — TELEPHONE (OUTPATIENT)
Dept: NEUROLOGY | Facility: CLINIC | Age: 53
End: 2018-06-25

## 2018-06-25 NOTE — TELEPHONE ENCOUNTER
I spoke w/wife, who states they cannot attend surgery conference meeting tomorrow. She asked that the doctor call them afterwards to discuss the plan of care.

## 2018-06-26 ENCOUNTER — TELEPHONE (OUTPATIENT)
Dept: NEUROLOGY | Facility: CLINIC | Age: 53
End: 2018-06-26

## 2018-06-26 ENCOUNTER — TELEPHONE (OUTPATIENT)
Dept: DERMATOLOGY | Facility: CLINIC | Age: 53
End: 2018-06-26

## 2018-06-26 DIAGNOSIS — G40.409 EPILEPSY SYMPTOMATIC, GENERALIZED: ICD-10-CM

## 2018-06-26 LAB — LAMOTRIGINE SERPL-MCNC: 1.3 UG/ML (ref 2–15)

## 2018-06-26 NOTE — TELEPHONE ENCOUNTER
"Epilepsy Surgery Conference   Attendees:  Dr. Shafer, Dr. Posey, Dr. Shepard, Dr. Adam, Cari Parada (PA), Alla Hannon, Jac Villegas    Date  6/26/18   MRN [id number]   Name  Earnest Morgan   Gender  Male   Age  53  yrs   Allergies NKDA   Physical Exam Nonfocal   BMI  BMI: 26.31; 88 kg, 6   PMH  HTN, nephrolithiasis    AEDs lacosamide (Vimpat) 200 mg BID  lamotrigine (Lamictal) 100 mg BID (titrating up, not yet therapeutic)   Failed AEDs levetiracetam (Keppra XR) dose unknown- ineffective   phenytoin (Dilantin) 300 mg BID- ineffective (levels 12.2, 10.3)   Compliance  Alarms, family/wife   Age of onset  Approximately age 26      Semiology #1 With respect to aura, the patient reports no aura as he is asleep.  His seizure is initially characterized by grunting and lip smacking.  He then gets stiff all over and has generalized convulsion.  He endorses tongue biting (on the side of the tongue).  He has had urinary incontinence.  His eyes are open and rolled back.  This component of this spell lasts for approximately 5-15 minutes.  Afterwards, he is "out of it" for a few minutes.     Ictal onset  Approximately age 26   Hz  Nightly at baseline, improved to once every few weeks on medication   Semiology #2 The patient's seizures began at least 10 years ago. With respect to aura, the patient reports no aura typically.  Occasionally, he will have some dizziness for a few seconds before hand.  His seizure is characterized by behavioral arrest.  He has grunting, lip smacking, and purposeless movements of the hands (can be either hand).  In some cases, he will progress to generalized stiffening and convulsions.  These spells lasts for approximately 4-5 minutes.  Afterwards, he reports feeling weak and "bad" for up to a day or two afterwards.     Ictal onset  Approximately age 41   Hz  Daily at baseline, every few weeks on medication         Work up   Paraneoplastic panel   NA   EMU The study begins 07/18/2017 " and ends 07/22/2017.  The total time of video EEG recording was approximately 86 hours.   # seizures recorded  3   Interictal   Independent left and right frontotemporal focal polymorphic delta and theta range slowing were noted.  Independent left and right temporal maximum sharp waves were noted at F8, T4, and F7, T3 respectively   Ictal onset  Seizure #1 was recorded during wakefulness.  The onset was characterized by   buildup of rhythmical mixed theta range activity in the right hemisphere, which   was poorly localized.  The evolution was maximal in the temporal region with   involvement of the parasagittal and central channels.  The activity evolved in   morphology and frequency to a rhythmical theta range activity with overriding   fast activity.  The activity gradually spread to the contralateral hemisphere   and involved rhythmical alpha and theta range activity with subsequent secondary   generalization.  The offset was characterized by diffuse suppression.  Seizure #2 presented similarly to seizure #1.  The offset was characterized by   diffuse suppression with subsequent greater degree of suppression over the right   hemisphere.  Seizure #3 presented electrographically similar to seizure #1.   Semiology  Seizure #1 was recorded during wakefulness.  The patient had orofacial   automatisms approximately 9 seconds after the electrographic onset of the   seizure.  This was followed by left upper extremity automatisms followed by left   version followed by secondary generalized tonic-clonic seizure involving   figure-of-four presentation with extension of the left upper extremity.  Seizure #2 occurred during wakefulness and presented similar to seizure #1.  The   onset of the orofacial automatisms was approximately 4 seconds after the   electrographic onset of seizure.  The patient also had intermittent head nodding   and was able to speak until approximately 20 minutes after the electrographic   onset of the  seizure.  Seizure #3 presented clinically similar to seizure #1.   3T MRI No seizure focus localized.   PET No seizure focus localized.   HEDY This epilepsy MSI exam localized bitemporal spike sources.  17 spike sources were localized the right anterior and mesial temporal lobe with a horizontal orientation, corresponding to right anterior temporal EEG sharp waves.  Four spike sources were localized to the left temporal lobe with vertical orientation, and corresponding to EEG left temporal sharp waves with less convincing epileptiform morphology. Both types of spike sources are commonly seen in association with mesial temporal lobe epilepsy.     Neuropsych testing   Unfortunately, results from testing were not interpretable due to variable/inconsistent effort during the assessment. Specifically, neuropsychological tests require that an individual put forth sufficient cognitive effort when taking cognitive tests. If an individual does not put forth adequate effort, then they can appear more cognitively impaired than is actually the case (e.g., with better effort their test scores would be higher). In order to assess for effort, Performance Validity Tests (PVTs) are administered within a traditional battery of cognitive tests to examine a patient's level of effort exerted and/or response style. PVTs do not correspond to cognitive impairment, but are very reliable indicators of effort/response style during cognitive testing. If an individual performs in the invalid range on PVTs, then it suggests that a patient had difficulty fully engaging in testing. The PVTs administered during this patient's exam were largely invalid and suggested that the patient had difficulty remaining focused/attentive/effortful during the exam. This mirrored behavioral observations of trouble remaining cognitively engaged in testing, as well. As a result, I am unable to determine with any certainty the presence or severity of cognitive  impairment. This also limits my ability to determine any lateralization/focality of cognitive impairment prior to potential epilepsy surgery.     Results were discussed with Mr. Morgan and his wife. They agreed that he needed to address his depression and anxiety more intensively now. Afterward, follow up testing could be conducted to assess his true cognitive functioning when would be able to engage in testing better. His wife will ensure that he addresses this. I provided referrals in the community and they were appreciative of the feedback and will follow up with Dr. Castrejon. Of note, patient is RPR positive and its unclear if he has prior syphillis. Recommend follow-up by his primary care providers if not already performed. Additionally, recommend eye exam and audiology exam given report of visual/hearing difficulties.    Occupation  [employer]   Family support  Family    How would a successful surgery change your life? Increase safety/improve quality of life         Impression:  Focal onset epilepsy, right temporal lobe seizure focus    More tests:   Repeat MRI of the brain (3T)  Likely repeat neuropsychological testing    Plan:   Clinic visit with Dr. Adam  Consider additional monitoring with bilateral TL depth electrodes prior to resection

## 2018-06-28 ENCOUNTER — TELEPHONE (OUTPATIENT)
Dept: NEUROLOGY | Facility: CLINIC | Age: 53
End: 2018-06-28

## 2018-06-28 NOTE — TELEPHONE ENCOUNTER
Patient's wife notified, and verbalized understanding.  Appt scheduled with Dr. Castrejon on 7/24/2018.

## 2018-06-28 NOTE — TELEPHONE ENCOUNTER
----- Message from Rach Person sent at 6/28/2018  1:02 PM CDT -----  Contact: carlos/wife [phone]  States that she is returning call. Please call back at [phone]//thank you acc

## 2018-07-03 ENCOUNTER — HOSPITAL ENCOUNTER (OUTPATIENT)
Dept: RADIOLOGY | Facility: HOSPITAL | Age: 53
Discharge: HOME | End: 2018-07-03
Attending: NEUROLOGICAL SURGERY
Payer: COMMERCIAL

## 2018-07-03 DIAGNOSIS — G40.409 EPILEPSY SYMPTOMATIC, GENERALIZED: ICD-10-CM

## 2018-07-03 LAB
CREAT SERPL-MCNC: 1.9 MG/DL (ref 0.5–1.4)
SAMPLE: ABNORMAL

## 2018-07-03 PROCEDURE — A9585 GADOBUTROL INJECTION: HCPCS | Performed by: NEUROLOGICAL SURGERY

## 2018-07-03 PROCEDURE — 70553 MRI BRAIN STEM W/O & W/DYE: CPT | Mod: 26,,, | Performed by: RADIOLOGY

## 2018-07-03 PROCEDURE — 70553 MRI BRAIN STEM W/O & W/DYE: CPT | Mod: TC

## 2018-07-03 PROCEDURE — 25500020 PHARM REV CODE 255: Performed by: NEUROLOGICAL SURGERY

## 2018-07-03 RX ORDER — GADOBUTROL 604.72 MG/ML
9 INJECTION INTRAVENOUS
Status: COMPLETED | OUTPATIENT
Start: 2018-07-03 | End: 2018-07-03

## 2018-07-03 RX ADMIN — GADOBUTROL 9 ML: 604.72 INJECTION INTRAVENOUS at 02:07

## 2018-07-06 DIAGNOSIS — G40.219 COMPLEX PARTIAL EPILEPSY WITH GENERALIZATION AND WITH INTRACTABLE EPILEPSY: ICD-10-CM

## 2018-07-06 RX ORDER — LAMOTRIGINE 25 MG/1
100 TABLET ORAL 2 TIMES DAILY
Qty: 60 TABLET | Refills: 2 | Status: SHIPPED | OUTPATIENT
Start: 2018-07-06 | End: 2018-07-06 | Stop reason: SDUPTHER

## 2018-07-06 RX ORDER — LAMOTRIGINE 100 MG/1
TABLET ORAL
Qty: 60 TABLET | Refills: 2
Start: 2018-07-06 | End: 2019-01-28 | Stop reason: SDUPTHER

## 2018-07-06 NOTE — TELEPHONE ENCOUNTER
----- Message from Joycelyn Mendozakaren sent at 7/6/2018 11:18 AM CDT -----  Contact: Wife Ashlyn  Doctor has increased the dosage for the patients seizure med lamoTRIgine (LAMICTAL) 25 MG tablet.  He is now taking 4 in AM 4 in PM = 240 tabs.   So need corrected prescription sent to    [facility] - [place], [place] - [address] [facility] [address]  [address] [address] [address]  Phone: [phone] Fax: [phone]    Call back [phone] (home).  Thank you!

## 2018-07-17 NOTE — SUBJECTIVE & OBJECTIVE
Interval History: No events overnight.     Current Facility-Administered Medications   Medication Dose Route Frequency Provider Last Rate Last Dose    amlodipine tablet 10 mg  10 mg Oral Daily Franca Marshall MD   10 mg at 07/19/17 0958    docusate sodium capsule 100 mg  100 mg Oral BID Franca Marshall MD   100 mg at 07/19/17 0958    ibuprofen tablet 600 mg  600 mg Oral Q6H PRN Franca Marshall MD        ondansetron disintegrating tablet 8 mg  8 mg Oral Q8H PRN Franca Marshall MD        sodium chloride 0.9% flush 3 mL  3 mL Intravenous Q8H Franca Marshall MD   3 mL at 07/19/17 0636     Continuous Infusions:     Review of Systems   Constitutional: Negative.    Eyes: Negative.    Respiratory: Negative.    Cardiovascular: Negative.    Gastrointestinal: Negative.    Endocrine: Negative.    Genitourinary: Negative.         Urinary incontinence    Musculoskeletal: Negative.    Neurological: Positive for dizziness, seizures, syncope and headaches.   Psychiatric/Behavioral: Negative.      Objective:     Vital Signs (Most Recent):  Temp: 98.1 °F (36.7 °C) (07/19/17 0740)  Pulse: 65 (07/19/17 0740)  Resp: 17 (07/19/17 0740)  BP: (!) 158/97 (07/19/17 0740)  SpO2: 98 % (07/19/17 0740) Vital Signs (24h Range):  Temp:  [97.4 °F (36.3 °C)-98.4 °F (36.9 °C)] 98.1 °F (36.7 °C)  Pulse:  [58-68] 65  Resp:  [16-19] 17  SpO2:  [94 %-99 %] 98 %  BP: (135-158)/(89-98) 158/97     Weight: 87.1 kg (192 lb)  Body mass index is 26.04 kg/m².    Physical Exam  Constitutional  Well-developed, well-nourished, appears stated age       Cardiovascular  Radial pulses 2+ and symmetric, no LE edema bilaterally   Neurological    * Mental status      - Orientation  Oriented to person, place, time, and situation     - Memory   Intact recent and remote     - Attention/concentration  Attentive, vigilant during exam     - Language  Naming & repetition intact, +2-step commands     - Fund of knowledge  Aware of current events     -  Executive  Well-organized thoughts     - Other     * Cranial nerves       - CN II  PERRL, visual fields full to confrontation     - CN III, IV, VI  Extraocular movements full, normal pursuits and saccades     - CN V  Sensation V1 - V3 intact     - CN VII  Face strong and symmetric bilaterally     - CN VIII  Hearing intact bilaterally     - CN IX, X  Palate raises midline and symmetric     - CN XI  SCM and trapezius 5/5 bilaterally     - CN XII  Tongue midline   * Motor  Muscle bulk normal, strength 5/5 throughout   * Sensory   Intact to temperature and vibration throughout   * Coordination  No dysmetria with finger-to-nose or heel-to-shin   * Gait  See below.   * Deep tendon reflexes  2+ and symmetric throughout                Significant Labs:   Recent Lab Results       07/18/17  1933 07/18/17  1932 07/18/17  1736      Benzodiazepines Negative       Methadone metabolites Negative       Phencyclidine Negative       Albumin   3.7     Alkaline Phosphatase   128     ALT   11     Amphetamine Screen, Ur Negative       Anion Gap   7(L)     Appearance, UA  Clear      AST   13     Barbiturate Screen, Ur Negative       Baso #   0.01     Basophil%   0.2     Bilirubin (UA)  Negative      Total Bilirubin   0.2  Comment:  For infants and newborns, interpretation of results should be based  on gestational age, weight and in agreement with clinical  observations.  Premature Infant recommended reference ranges:  Up to 24 hours.............<8.0 mg/dL  Up to 48 hours............<12.0 mg/dL  3-5 days..................<15.0 mg/dL  6-29 days.................<15.0 mg/dL       BUN, Bld   18     Calcium   8.6(L)     Chloride   109     CO2   24     Cocaine (Metab.) Negative       Color, UA  Straw      Creatinine   1.3     Creatinine, Random Ur 77.0  Comment:  The random urine reference ranges provided were established   for 24 hour urine collections.  No reference ranges exist for  random urine specimens.  Correlate clinically.          Differential Method   Automated     eGFR if    >60.0     eGFR if non    >60.0  Comment:  Calculation used to obtain the estimated glomerular filtration  rate (eGFR) is the CKD-EPI equation. Since race is unknown   in our information system, the eGFR values for   -American and Non--American patients are given   for each creatinine result.       Eos #   0.1     Eosinophil%   1.0     Glucose   84     Glucose, UA  Negative      Gran #   3.1     Gran%   63.0     Hematocrit   36.3(L)     Hemoglobin   12.4(L)     Ketones, UA  Negative      Leukocytes, UA  Negative      Lymph #   1.4     Lymph%   27.6     MCH   30.5     MCHC   34.2     MCV   89     Mono #   0.4     Mono%   7.8     MPV   9.5     Nitrite, UA  Negative      Occult Blood UA  Negative      Opiate Scrn, Ur Negative       pH, UA  6.0      Platelets   125(L)     Potassium   4.3     Total Protein   7.0     Protein, UA  Negative  Comment:  Recommend a 24 hour urine protein or a urine   protein/creatinine ratio if globulin induced proteinuria is  clinically suspected.        RBC   4.06(L)     RDW   13.2     Sodium   140     Specific Gravity, UA  1.015      Specimen UA  Urine, Clean Catch      Marijuana (THC) Metabolite Negative       Toxicology Information SEE COMMENT  Comment:  This screen includes the following classes of drugs at the   listed cut-off:  Benzodiazepines                  200 ng/ml  Methadone                        300 ng/ml  Cocaine metabolite               300 ng/ml  Opiates                          300 ng/ml  Barbiturates                     200 ng/ml  Amphetamines                    1000 ng/ml  Marijuana metabs (THC)            50 ng/ml  Phencyclidine (PCP)               25 ng/ml  High concentrations of Diphenhydramine may cross-react with  Phencyclidine PCP screening immunoassay giving a false   positive result.  High concentrations of Methylenedioxymethamphetamine (MDMA aka  Ectasy) and other  structurally similar compounds may cross-   react with the Amphetamine/Methamphetamine screening   immunoassay giving a false positive result.  A metabolite of the anti-HIV drug Sustiva () may cause  false positive results in the Marijuana metabolite (THC)   screening assay.  Note: This exception list includes only more common   interferants in toxicology screen testing.  Because of many   cross-reactantspositive results on toxicology drug screens   should be confirmed whenever results do not correlate with   clinical presentation.  This report is intended for use in clinical monitoring and  management of patients. It is not intended for use in   employment related drug testing.  Because of any cross-reactants, positive results on toxicology  drug screens should be confirmed whenever results do not  correlate with clinical presentation.  Presumptive positive results are unconfirmed and may be used   only for medical purposes.         Urobilinogen, UA  Negative      WBC   4.89         All pertinent lab results from the past 24 hours have been reviewed.    Significant Studies: I have reviewed all pertinent imaging results/findings within the past 24 hours.   Additional Notes: Patient brought in Pixie Pen, a cautery type of gadget she bought to treat skin tags. Dr. Garrison evaluated the gadget and does not contain information in how to set it up (strength) and use. Recommend patient not to use it. \\nSuggested the skin tag can be removed in office if bothersome.

## 2018-07-18 ENCOUNTER — TELEPHONE (OUTPATIENT)
Dept: NEUROLOGY | Facility: CLINIC | Age: 53
End: 2018-07-18

## 2018-07-18 NOTE — TELEPHONE ENCOUNTER
Spoke with the pt's wife, instructed to keep the upcoming appt per Dr. Castrejon. verbalized understanding.

## 2018-07-18 NOTE — TELEPHONE ENCOUNTER
The patient has an appointment with you on 7/24/2018.  He is scheduled to see Dr. Adam on 8/7/2018.  Does he need to see you before or after Dr. Adam?  Please advise.   Yes

## 2018-07-24 ENCOUNTER — OFFICE VISIT (OUTPATIENT)
Dept: NEUROLOGY | Facility: CLINIC | Age: 53
End: 2018-07-24
Payer: COMMERCIAL

## 2018-07-24 ENCOUNTER — LAB VISIT (OUTPATIENT)
Dept: LAB | Facility: HOSPITAL | Age: 53
End: 2018-07-24
Attending: PSYCHIATRY & NEUROLOGY
Payer: COMMERCIAL

## 2018-07-24 VITALS
HEIGHT: 72 IN | SYSTOLIC BLOOD PRESSURE: 146 MMHG | DIASTOLIC BLOOD PRESSURE: 88 MMHG | BODY MASS INDEX: 26.73 KG/M2 | WEIGHT: 197.38 LBS | HEART RATE: 63 BPM | RESPIRATION RATE: 20 BRPM

## 2018-07-24 DIAGNOSIS — G40.219 COMPLEX PARTIAL EPILEPSY WITH GENERALIZATION AND WITH INTRACTABLE EPILEPSY: Primary | ICD-10-CM

## 2018-07-24 DIAGNOSIS — G40.219 COMPLEX PARTIAL EPILEPSY WITH GENERALIZATION AND WITH INTRACTABLE EPILEPSY: ICD-10-CM

## 2018-07-24 LAB
ALBUMIN SERPL BCP-MCNC: 4.4 G/DL
ALP SERPL-CCNC: 101 U/L
ALT SERPL W/O P-5'-P-CCNC: 22 U/L
ANION GAP SERPL CALC-SCNC: 9 MMOL/L
AST SERPL-CCNC: 19 U/L
BASOPHILS # BLD AUTO: 0.02 K/UL
BASOPHILS NFR BLD: 0.3 %
BILIRUB SERPL-MCNC: 0.5 MG/DL
BUN SERPL-MCNC: 24 MG/DL
CALCIUM SERPL-MCNC: 9.6 MG/DL
CHLORIDE SERPL-SCNC: 108 MMOL/L
CO2 SERPL-SCNC: 22 MMOL/L
CREAT SERPL-MCNC: 1.9 MG/DL
DIFFERENTIAL METHOD: ABNORMAL
EOSINOPHIL # BLD AUTO: 0.1 K/UL
EOSINOPHIL NFR BLD: 0.8 %
ERYTHROCYTE [DISTWIDTH] IN BLOOD BY AUTOMATED COUNT: 13.3 %
EST. GFR  (AFRICAN AMERICAN): 45.8 ML/MIN/1.73 M^2
EST. GFR  (NON AFRICAN AMERICAN): 39.7 ML/MIN/1.73 M^2
GLUCOSE SERPL-MCNC: 92 MG/DL
HCT VFR BLD AUTO: 37.2 %
HGB BLD-MCNC: 11.7 G/DL
IMM GRANULOCYTES # BLD AUTO: 0.05 K/UL
IMM GRANULOCYTES NFR BLD AUTO: 0.7 %
LYMPHOCYTES # BLD AUTO: 1.8 K/UL
LYMPHOCYTES NFR BLD: 23.8 %
MCH RBC QN AUTO: 28.2 PG
MCHC RBC AUTO-ENTMCNC: 31.5 G/DL
MCV RBC AUTO: 90 FL
MONOCYTES # BLD AUTO: 0.7 K/UL
MONOCYTES NFR BLD: 8.5 %
NEUTROPHILS # BLD AUTO: 5.1 K/UL
NEUTROPHILS NFR BLD: 65.9 %
NRBC BLD-RTO: 0 /100 WBC
PLATELET # BLD AUTO: 136 K/UL
PMV BLD AUTO: 11.7 FL
POTASSIUM SERPL-SCNC: 4.3 MMOL/L
PROT SERPL-MCNC: 7.6 G/DL
RBC # BLD AUTO: 4.15 M/UL
SODIUM SERPL-SCNC: 139 MMOL/L
WBC # BLD AUTO: 7.66 K/UL

## 2018-07-24 PROCEDURE — 36415 COLL VENOUS BLD VENIPUNCTURE: CPT | Mod: PO

## 2018-07-24 PROCEDURE — 99999 PR PBB SHADOW E&M-EST. PATIENT-LVL III: CPT | Mod: PBBFAC,,, | Performed by: PSYCHIATRY & NEUROLOGY

## 2018-07-24 PROCEDURE — 85025 COMPLETE CBC W/AUTO DIFF WBC: CPT

## 2018-07-24 PROCEDURE — 3008F BODY MASS INDEX DOCD: CPT | Mod: CPTII,S$GLB,, | Performed by: PSYCHIATRY & NEUROLOGY

## 2018-07-24 PROCEDURE — 80175 DRUG SCREEN QUAN LAMOTRIGINE: CPT

## 2018-07-24 PROCEDURE — 3079F DIAST BP 80-89 MM HG: CPT | Mod: CPTII,S$GLB,, | Performed by: PSYCHIATRY & NEUROLOGY

## 2018-07-24 PROCEDURE — 3077F SYST BP >= 140 MM HG: CPT | Mod: CPTII,S$GLB,, | Performed by: PSYCHIATRY & NEUROLOGY

## 2018-07-24 PROCEDURE — 80053 COMPREHEN METABOLIC PANEL: CPT

## 2018-07-24 PROCEDURE — 99215 OFFICE O/P EST HI 40 MIN: CPT | Mod: S$GLB,,, | Performed by: PSYCHIATRY & NEUROLOGY
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
## 2018-07-27 LAB — LAMOTRIGINE SERPL-MCNC: 2.6 UG/ML (ref 2–15)

## 2018-08-07 ENCOUNTER — TELEPHONE (OUTPATIENT)
Dept: NEUROLOGY | Facility: CLINIC | Age: 53
End: 2018-08-07

## 2018-08-07 ENCOUNTER — OFFICE VISIT (OUTPATIENT)
Dept: NEUROSURGERY | Facility: CLINIC | Age: 53
End: 2018-08-07
Payer: COMMERCIAL

## 2018-08-07 VITALS
DIASTOLIC BLOOD PRESSURE: 81 MMHG | HEIGHT: 72 IN | SYSTOLIC BLOOD PRESSURE: 136 MMHG | HEART RATE: 59 BPM | BODY MASS INDEX: 27.21 KG/M2 | TEMPERATURE: 98 F | WEIGHT: 200.88 LBS

## 2018-08-07 DIAGNOSIS — G40.219 COMPLEX PARTIAL EPILEPSY WITH GENERALIZATION AND WITH INTRACTABLE EPILEPSY: Primary | ICD-10-CM

## 2018-08-07 PROCEDURE — 99245 OFF/OP CONSLTJ NEW/EST HI 55: CPT | Mod: S$GLB,,, | Performed by: NEUROLOGICAL SURGERY

## 2018-08-07 PROCEDURE — 99999 PR PBB SHADOW E&M-EST. PATIENT-LVL III: CPT | Mod: PBBFAC,,, | Performed by: NEUROLOGICAL SURGERY

## 2018-08-07 NOTE — H&P (VIEW-ONLY)
Subjective:    I, Kimberley Baum, attest that this documentation has been prepared under the direction and in the presence of KRISTEN dAam MD.     Patient ID: Earnest Morgan is a 53 y.o. male.    Chief Complaint: Consult    HPI   Pt is a 53 y.o. male who presents per referral by Dr. Rashaad Castrejon Jr. for evaluation of possible epilepsy surgical evaluation. This is a patient with history of complex epilepsy since age of 36. He has failed 4 antiepileptic medications including Vimpat, Lamictal and Protonix. Per family, pt has a recent increase in cognitive abilities, including being easily agitated.     Review of Systems   Constitutional: Negative for chills, diaphoresis, fatigue and fever.   HENT: Negative for congestion, rhinorrhea, sinus pressure, sneezing, sore throat and trouble swallowing.    Eyes: Negative.  Negative for visual disturbance.   Respiratory: Negative for cough, choking, chest tightness and shortness of breath.    Cardiovascular: Negative for chest pain.   Gastrointestinal: Negative for abdominal pain, diarrhea, nausea and vomiting.   Endocrine: Negative.    Genitourinary: Negative for dysuria.   Skin: Negative for color change, pallor, rash and wound.   Neurological: Positive for seizures. Negative for syncope.   Hematological: Does not bruise/bleed easily.   Psychiatric/Behavioral: Negative for confusion.       Objective:      Physical Exam:  Nursing note and vitals reviewed.    Constitutional: He appears well-developed.     Eyes: Pupils are equal, round, and reactive to light. Conjunctivae and EOM are normal.     Cardiovascular: Normal rate, regular rhythm, normal pulses and intact distal pulses.     Abdominal: Soft.     Psych/Behavior: He is alert. He is oriented to person, place, and time. He has a normal mood and affect.     Musculoskeletal: Gait is normal.        Neck: Range of motion is full. There is no tenderness. Muscle strength is 5/5. Tone is normal.        Back: Range of motion is full.  There is no tenderness. Muscle strength is 5/5. Tone is normal.        Right Upper Extremities: Range of motion is full. There is no tenderness. Muscle strength is 5/5. Tone is normal.        Left Upper Extremities: Range of motion is full. There is no tenderness. Muscle strength is 5/5. Tone is normal.       Right Lower Extremities: Range of motion is full. There is no tenderness. Muscle strength is 5/5. Tone is normal.        Left Lower Extremities: Range of motion is full. There is no tenderness. Muscle strength is 5/5. Tone is normal.     Neurological:        Coordination: He has a normal Romberg Test, normal finger to nose coordination, normal heel to shin coordination and normal tandem walking coordination.        DTRs: DTRs are normal. Tricep reflexes are 2+ on the right side and 2+ on the left side. Bicep reflexes are 2+ on the right side and 2+ on the left side. Brachioradialis reflexes are 2+ on the right side and 2+ on the left side. Patellar reflexes are 2+ on the right side and 2+ on the left side. Achilles reflexes are 2+ on the right side and 2+ on the left side.        Cranial nerves: Cranial nerve(s) II, III, IV, V, VI, VII, VIII, IX, X, XI and XII are intact.       Pt is awake and alert.  Speech a little slow, but his cranial nerves are intact.   No obvious focal deficits.   Full strength.     Imaging:   MRI Brain, dated 7/3/2018, shows some generalized volume loss and some scattered white matter disease. Increased white matter disease in the bifrontal area. No hydrocephalus. No obvious heterotopia;.     PET scan was normal.      left and right frontal temporal areas. HEDY localized bitemporal spikes.     Assessment/Plan:   Pt with history of complex history of partial epilepsy with generalization that has failed 4 medications. Bitemporal spikes on HEDY and EMU evaluation. Non lesional. We discussed the possibility of electrodes and possible RNS vs VNS stimulation. Pt and his wife thinks that they  are hesitant for any intercranial procedure and think that VNS is better for him. Pt also has concomitant depression, but also does not burn any bridges if we need to pursue RNA or laser ablation down the road if VNS not helped as much as we hoped..     I have discussed the risks/benefits, indications, and alternatives for the proposed procedure in detail. I have answered all of their questions and patient wishes to proceed with surgery. We will schedule patient.     I, KRISTEN Adam MD, personally performed the services described in this documentation. All medical record entries made by the scribe, Kimberley Baum, were at my direction and in my presence.  I have reviewed the chart and agree that the record reflects my personal performance and is accurate and complete.

## 2018-08-07 NOTE — LETTER
August 12, 2018      Rashaad Castrejon Jr., MD  [address] [facility] [address] [place] [address]           [facility] - Neurosurgery 7th Fl  [address]  [facility] [address]  Phone: [phone]          Patient: Earnest Morgan   MR Number: [id number]   YOB: 1965   Date of Visit: 8/7/2018       Dear Dr. Rashaad Castrejon Jr.:    Thank you for referring Earnest Morgan to me for evaluation. Attached you will find relevant portions of my assessment and plan of care.    If you have questions, please do not hesitate to call me. I look forward to following Earnest Morgan along with you.    Sincerely,    Yair Adam MD    Enclosure  CC:  No Recipients    If you would like to receive this communication electronically, please contact [email or web address] or [phone] to request more information on [employer] Link access.    For providers and/or their staff who would like to refer a patient to [facility], please contact us through our one-stop-shop provider referral line, [facility], at [phone].    If you feel you have received this communication in error or would no longer like to receive these types of communications, please e-mail [email or web address]

## 2018-08-07 NOTE — TELEPHONE ENCOUNTER
Pt reports Dr. Adam provided them with Orders for labs but stated Dr. Castrejon may have to authorize. The pt would like to fax to our office, I provided fax number.

## 2018-08-07 NOTE — TELEPHONE ENCOUNTER
----- Message from Cassy Magallon sent at 8/7/2018  2:00 PM CDT -----  Contact: Wife, Ashlyn Goldberg want to speak with a nurse regarding if she need to drop orders off please call back at [phone] (home)

## 2018-08-07 NOTE — PROGRESS NOTES
Subjective:    I, Kimberley Baum, attest that this documentation has been prepared under the direction and in the presence of KRISTEN Adam MD.     Patient ID: Earnest Morgan is a 53 y.o. male.    Chief Complaint: Consult    HPI   Pt is a 53 y.o. male who presents per referral by Dr. Rashaad Castrejon Jr. for evaluation of possible epilepsy surgical evaluation. This is a patient with history of complex epilepsy since age of 36. He has failed 4 antiepileptic medications including Vimpat, Lamictal and Protonix. Per family, pt has a recent increase in cognitive abilities, including being easily agitated.     Review of Systems   Constitutional: Negative for chills, diaphoresis, fatigue and fever.   HENT: Negative for congestion, rhinorrhea, sinus pressure, sneezing, sore throat and trouble swallowing.    Eyes: Negative.  Negative for visual disturbance.   Respiratory: Negative for cough, choking, chest tightness and shortness of breath.    Cardiovascular: Negative for chest pain.   Gastrointestinal: Negative for abdominal pain, diarrhea, nausea and vomiting.   Endocrine: Negative.    Genitourinary: Negative for dysuria.   Skin: Negative for color change, pallor, rash and wound.   Neurological: Positive for seizures. Negative for syncope.   Hematological: Does not bruise/bleed easily.   Psychiatric/Behavioral: Negative for confusion.       Objective:      Physical Exam:  Nursing note and vitals reviewed.    Constitutional: He appears well-developed.     Eyes: Pupils are equal, round, and reactive to light. Conjunctivae and EOM are normal.     Cardiovascular: Normal rate, regular rhythm, normal pulses and intact distal pulses.     Abdominal: Soft.     Psych/Behavior: He is alert. He is oriented to person, place, and time. He has a normal mood and affect.     Musculoskeletal: Gait is normal.        Neck: Range of motion is full. There is no tenderness. Muscle strength is 5/5. Tone is normal.        Back: Range of motion is full.  There is no tenderness. Muscle strength is 5/5. Tone is normal.        Right Upper Extremities: Range of motion is full. There is no tenderness. Muscle strength is 5/5. Tone is normal.        Left Upper Extremities: Range of motion is full. There is no tenderness. Muscle strength is 5/5. Tone is normal.       Right Lower Extremities: Range of motion is full. There is no tenderness. Muscle strength is 5/5. Tone is normal.        Left Lower Extremities: Range of motion is full. There is no tenderness. Muscle strength is 5/5. Tone is normal.     Neurological:        Coordination: He has a normal Romberg Test, normal finger to nose coordination, normal heel to shin coordination and normal tandem walking coordination.        DTRs: DTRs are normal. Tricep reflexes are 2+ on the right side and 2+ on the left side. Bicep reflexes are 2+ on the right side and 2+ on the left side. Brachioradialis reflexes are 2+ on the right side and 2+ on the left side. Patellar reflexes are 2+ on the right side and 2+ on the left side. Achilles reflexes are 2+ on the right side and 2+ on the left side.        Cranial nerves: Cranial nerve(s) II, III, IV, V, VI, VII, VIII, IX, X, XI and XII are intact.       Pt is awake and alert.  Speech a little slow, but his cranial nerves are intact.   No obvious focal deficits.   Full strength.     Imaging:   MRI Brain, dated 7/3/2018, shows some generalized volume loss and some scattered white matter disease. Increased white matter disease in the bifrontal area. No hydrocephalus. No obvious heterotopia;.     PET scan was normal.      left and right frontal temporal areas. HEDY localized bitemporal spikes.     Assessment/Plan:   Pt with history of complex history of partial epilepsy with generalization that has failed 4 medications. Bitemporal spikes on HEDY and EMU evaluation. Non lesional. We discussed the possibility of electrodes and possible RNS vs VNS stimulation. Pt and his wife thinks that they  are hesitant for any intercranial procedure and think that VNS is better for him. Pt also has concomitant depression, but also does not burn any bridges if we need to pursue RNA or laser ablation down the road if VNS not helped as much as we hoped..     I have discussed the risks/benefits, indications, and alternatives for the proposed procedure in detail. I have answered all of their questions and patient wishes to proceed with surgery. We will schedule patient.     I, KRISTEN Adam MD, personally performed the services described in this documentation. All medical record entries made by the scribe, Kimberley Baum, were at my direction and in my presence.  I have reviewed the chart and agree that the record reflects my personal performance and is accurate and complete.

## 2018-08-08 ENCOUNTER — TELEPHONE (OUTPATIENT)
Dept: NEUROSURGERY | Facility: CLINIC | Age: 53
End: 2018-08-08

## 2018-08-08 DIAGNOSIS — G40.211 LOCALIZATION-RELATED (FOCAL) (PARTIAL) SYMPTOMATIC EPILEPSY AND EPILEPTIC SYNDROMES WITH COMPLEX PARTIAL SEIZURES, INTRACTABLE, WITH STATUS EPILEPTICUS: Primary | ICD-10-CM

## 2018-08-15 ENCOUNTER — TELEPHONE (OUTPATIENT)
Dept: NEUROSURGERY | Facility: CLINIC | Age: 53
End: 2018-08-15

## 2018-08-15 NOTE — TELEPHONE ENCOUNTER
Spoke with the patient's wife, his new PCP is Glen Glass.  Pre-op paper faxed to [phone]. Spoke with Mayra at Dr. Glass's office, notified his surgery for the VNS is scheduled for 8/23/2018.  She is sending a message the office is closed today.

## 2018-08-22 ENCOUNTER — TELEPHONE (OUTPATIENT)
Dept: NEUROSURGERY | Facility: CLINIC | Age: 53
End: 2018-08-22

## 2018-08-22 RX ORDER — NAPROXEN SODIUM 220 MG
220 TABLET ORAL
Status: ON HOLD | COMMUNITY
End: 2018-08-23 | Stop reason: HOSPADM

## 2018-08-22 NOTE — PRE-PROCEDURE INSTRUCTIONS
Spoke with Patient's Wife - Ashlyn.  NPO, medication, and pre-op instructions reviewed.  Arrival time 1145.  Instructed that he can eat and drink until 0430, have clear liquids between 0430 - 1030, and then to remain NPO after 1030.  Denies previous problems with Anesthesia.  Stated that he has some loose teeth due to prolonged use of Dilantin.  She is not sure if they are upper or lower, but thinks that they are lower.  Wife verbalized understanding of instructions.

## 2018-08-22 NOTE — TELEPHONE ENCOUNTER
Spoke with patient's wife informed her of her husbands 11:30am surgery arrival time. Patient's wife was informed that patient needs to fast after midnight the night before surgery. Patient's wife was informed that patient needs to report to the 2nd floor day of surgery center.

## 2018-08-23 ENCOUNTER — HOSPITAL ENCOUNTER (OUTPATIENT)
Facility: HOSPITAL | Age: 53
Discharge: HOME | End: 2018-08-23
Attending: NEUROLOGICAL SURGERY | Admitting: NEUROLOGICAL SURGERY
Payer: COMMERCIAL

## 2018-08-23 ENCOUNTER — ANESTHESIA (OUTPATIENT)
Dept: SURGERY | Facility: HOSPITAL | Age: 53
End: 2018-08-23
Payer: COMMERCIAL

## 2018-08-23 ENCOUNTER — ANESTHESIA EVENT (OUTPATIENT)
Dept: SURGERY | Facility: HOSPITAL | Age: 53
End: 2018-08-23
Payer: COMMERCIAL

## 2018-08-23 VITALS
RESPIRATION RATE: 16 BRPM | HEART RATE: 59 BPM | WEIGHT: 198 LBS | SYSTOLIC BLOOD PRESSURE: 133 MMHG | BODY MASS INDEX: 26.82 KG/M2 | HEIGHT: 72 IN | OXYGEN SATURATION: 100 % | TEMPERATURE: 99 F | DIASTOLIC BLOOD PRESSURE: 88 MMHG

## 2018-08-23 DIAGNOSIS — G40.909 EPILEPSY: ICD-10-CM

## 2018-08-23 LAB
ABO + RH BLD: NORMAL
ANION GAP SERPL CALC-SCNC: 8 MMOL/L
APTT BLDCRRT: 42.3 SEC
BASOPHILS # BLD AUTO: 0.02 K/UL
BASOPHILS NFR BLD: 0.3 %
BLD GP AB SCN CELLS X3 SERPL QL: NORMAL
BLOOD GROUP ANTIBODIES SERPL: NORMAL
BUN SERPL-MCNC: 20 MG/DL
CALCIUM SERPL-MCNC: 9.1 MG/DL
CHLORIDE SERPL-SCNC: 110 MMOL/L
CO2 SERPL-SCNC: 22 MMOL/L
CREAT SERPL-MCNC: 1.4 MG/DL
DIFFERENTIAL METHOD: ABNORMAL
EOSINOPHIL # BLD AUTO: 0.1 K/UL
EOSINOPHIL NFR BLD: 0.9 %
ERYTHROCYTE [DISTWIDTH] IN BLOOD BY AUTOMATED COUNT: 13.5 %
EST. GFR  (AFRICAN AMERICAN): >60 ML/MIN/1.73 M^2
EST. GFR  (NON AFRICAN AMERICAN): 57.4 ML/MIN/1.73 M^2
GLUCOSE SERPL-MCNC: 82 MG/DL
HCT VFR BLD AUTO: 38.1 %
HGB BLD-MCNC: 12.5 G/DL
IMM GRANULOCYTES # BLD AUTO: 0.05 K/UL
IMM GRANULOCYTES NFR BLD AUTO: 0.7 %
INR PPP: 1.1
LYMPHOCYTES # BLD AUTO: 1.6 K/UL
LYMPHOCYTES NFR BLD: 21.2 %
MCH RBC QN AUTO: 29 PG
MCHC RBC AUTO-ENTMCNC: 32.8 G/DL
MCV RBC AUTO: 88 FL
MONOCYTES # BLD AUTO: 0.7 K/UL
MONOCYTES NFR BLD: 9.3 %
NEUTROPHILS # BLD AUTO: 5.1 K/UL
NEUTROPHILS NFR BLD: 67.6 %
NRBC BLD-RTO: 0 /100 WBC
PLATELET # BLD AUTO: 151 K/UL
PMV BLD AUTO: 10.9 FL
POTASSIUM SERPL-SCNC: 4.3 MMOL/L
PROTHROMBIN TIME: 11.1 SEC
RBC # BLD AUTO: 4.31 M/UL
SODIUM SERPL-SCNC: 140 MMOL/L
WBC # BLD AUTO: 7.54 K/UL

## 2018-08-23 PROCEDURE — 85730 THROMBOPLASTIN TIME PARTIAL: CPT

## 2018-08-23 PROCEDURE — 25000003 PHARM REV CODE 250: Performed by: NURSE ANESTHETIST, CERTIFIED REGISTERED

## 2018-08-23 PROCEDURE — 86880 COOMBS TEST DIRECT: CPT

## 2018-08-23 PROCEDURE — 86850 RBC ANTIBODY SCREEN: CPT

## 2018-08-23 PROCEDURE — 71000033 HC RECOVERY, INTIAL HOUR: Performed by: NEUROLOGICAL SURGERY

## 2018-08-23 PROCEDURE — 71000015 HC POSTOP RECOV 1ST HR: Performed by: NEUROLOGICAL SURGERY

## 2018-08-23 PROCEDURE — 37000009 HC ANESTHESIA EA ADD 15 MINS: Performed by: NEUROLOGICAL SURGERY

## 2018-08-23 PROCEDURE — 71000016 HC POSTOP RECOV ADDL HR: Performed by: NEUROLOGICAL SURGERY

## 2018-08-23 PROCEDURE — 36000709 HC OR TIME LEV III EA ADD 15 MIN: Performed by: NEUROLOGICAL SURGERY

## 2018-08-23 PROCEDURE — 36000708 HC OR TIME LEV III 1ST 15 MIN: Performed by: NEUROLOGICAL SURGERY

## 2018-08-23 PROCEDURE — C1778 LEAD, NEUROSTIMULATOR: HCPCS | Performed by: NEUROLOGICAL SURGERY

## 2018-08-23 PROCEDURE — 86870 RBC ANTIBODY IDENTIFICATION: CPT

## 2018-08-23 PROCEDURE — 85025 COMPLETE CBC W/AUTO DIFF WBC: CPT

## 2018-08-23 PROCEDURE — 86860 RBC ANTIBODY ELUTION: CPT

## 2018-08-23 PROCEDURE — 63600175 PHARM REV CODE 636 W HCPCS: Performed by: STUDENT IN AN ORGANIZED HEALTH CARE EDUCATION/TRAINING PROGRAM

## 2018-08-23 PROCEDURE — 25000003 PHARM REV CODE 250: Performed by: STUDENT IN AN ORGANIZED HEALTH CARE EDUCATION/TRAINING PROGRAM

## 2018-08-23 PROCEDURE — D9220A PRA ANESTHESIA: Mod: ANES,,, | Performed by: ANESTHESIOLOGY

## 2018-08-23 PROCEDURE — 64568 OPN IMPLTJ CRNL NRV NEA&PG: CPT | Mod: LT,,, | Performed by: NEUROLOGICAL SURGERY

## 2018-08-23 PROCEDURE — 25000003 PHARM REV CODE 250: Performed by: NEUROLOGICAL SURGERY

## 2018-08-23 PROCEDURE — 63600175 PHARM REV CODE 636 W HCPCS: Performed by: NURSE ANESTHETIST, CERTIFIED REGISTERED

## 2018-08-23 PROCEDURE — C1767 GENERATOR, NEURO NON-RECHARG: HCPCS | Performed by: NEUROLOGICAL SURGERY

## 2018-08-23 PROCEDURE — 85610 PROTHROMBIN TIME: CPT

## 2018-08-23 PROCEDURE — 80048 BASIC METABOLIC PNL TOTAL CA: CPT

## 2018-08-23 PROCEDURE — 27201423 OPTIME MED/SURG SUP & DEVICES STERILE SUPPLY: Performed by: NEUROLOGICAL SURGERY

## 2018-08-23 PROCEDURE — D9220A PRA ANESTHESIA: Mod: CRNA,,, | Performed by: NURSE ANESTHETIST, CERTIFIED REGISTERED

## 2018-08-23 PROCEDURE — 86902 BLOOD TYPE ANTIGEN DONOR EA: CPT

## 2018-08-23 PROCEDURE — 63600175 PHARM REV CODE 636 W HCPCS: Performed by: NEUROLOGICAL SURGERY

## 2018-08-23 PROCEDURE — 86905 BLOOD TYPING RBC ANTIGENS: CPT

## 2018-08-23 PROCEDURE — 37000008 HC ANESTHESIA 1ST 15 MINUTES: Performed by: NEUROLOGICAL SURGERY

## 2018-08-23 DEVICE — GENERATOR ASPIRE VNS: Type: IMPLANTABLE DEVICE | Site: NECK | Status: FUNCTIONAL

## 2018-08-23 DEVICE — LEAD PERENNIALFLEX 2MM 43CM: Type: IMPLANTABLE DEVICE | Site: NECK | Status: FUNCTIONAL

## 2018-08-23 RX ORDER — SODIUM CHLORIDE 9 MG/ML
INJECTION, SOLUTION INTRAVENOUS CONTINUOUS PRN
Status: DISCONTINUED | OUTPATIENT
Start: 2018-08-23 | End: 2018-08-23

## 2018-08-23 RX ORDER — CEFAZOLIN SODIUM 1 G/3ML
2 INJECTION, POWDER, FOR SOLUTION INTRAMUSCULAR; INTRAVENOUS
Status: COMPLETED | OUTPATIENT
Start: 2018-08-23 | End: 2018-08-23

## 2018-08-23 RX ORDER — PROPOFOL 10 MG/ML
VIAL (ML) INTRAVENOUS
Status: DISCONTINUED | OUTPATIENT
Start: 2018-08-23 | End: 2018-08-23

## 2018-08-23 RX ORDER — BACITRACIN 50000 [IU]/1
INJECTION, POWDER, FOR SOLUTION INTRAMUSCULAR
Status: DISCONTINUED | OUTPATIENT
Start: 2018-08-23 | End: 2018-08-23 | Stop reason: HOSPADM

## 2018-08-23 RX ORDER — MIDAZOLAM HYDROCHLORIDE 1 MG/ML
INJECTION INTRAMUSCULAR; INTRAVENOUS
Status: DISCONTINUED | OUTPATIENT
Start: 2018-08-23 | End: 2018-08-23

## 2018-08-23 RX ORDER — MUPIROCIN 20 MG/G
OINTMENT TOPICAL
Status: ACTIVE | OUTPATIENT
Start: 2018-08-23

## 2018-08-23 RX ORDER — VANCOMYCIN HYDROCHLORIDE 1 G/20ML
INJECTION, POWDER, LYOPHILIZED, FOR SOLUTION INTRAVENOUS
Status: DISCONTINUED | OUTPATIENT
Start: 2018-08-23 | End: 2018-08-23 | Stop reason: HOSPADM

## 2018-08-23 RX ORDER — ROCURONIUM BROMIDE 10 MG/ML
INJECTION, SOLUTION INTRAVENOUS
Status: DISCONTINUED | OUTPATIENT
Start: 2018-08-23 | End: 2018-08-23

## 2018-08-23 RX ORDER — CEPHALEXIN 125 MG/5ML
250 POWDER, FOR SUSPENSION ORAL EVERY 6 HOURS
Qty: 200 ML | Refills: 0 | Status: SHIPPED | OUTPATIENT
Start: 2018-08-23 | End: 2018-08-28

## 2018-08-23 RX ORDER — HYDROCODONE BITARTRATE AND ACETAMINOPHEN 5; 325 MG/1; MG/1
1 TABLET ORAL EVERY 6 HOURS PRN
Qty: 30 TABLET | Refills: 0 | Status: SHIPPED | OUTPATIENT
Start: 2018-08-23 | End: 2019-03-13 | Stop reason: ALTCHOICE

## 2018-08-23 RX ORDER — HYDROCODONE BITARTRATE AND ACETAMINOPHEN 5; 325 MG/1; MG/1
TABLET ORAL
Status: DISCONTINUED
Start: 2018-08-23 | End: 2018-08-23 | Stop reason: HOSPADM

## 2018-08-23 RX ORDER — MUPIROCIN 20 MG/G
1 OINTMENT TOPICAL
Status: COMPLETED | OUTPATIENT
Start: 2018-08-23 | End: 2018-08-23

## 2018-08-23 RX ORDER — HYDROCODONE BITARTRATE AND ACETAMINOPHEN 5; 325 MG/1; MG/1
1 TABLET ORAL ONCE
Status: COMPLETED | OUTPATIENT
Start: 2018-08-23 | End: 2018-08-23

## 2018-08-23 RX ORDER — CEPHALEXIN 125 MG/5ML
250 POWDER, FOR SUSPENSION ORAL EVERY 6 HOURS
Qty: 200 ML | Refills: 0 | OUTPATIENT
Start: 2018-08-23 | End: 2018-08-23 | Stop reason: SDUPTHER

## 2018-08-23 RX ORDER — NEOSTIGMINE METHYLSULFATE 1 MG/ML
INJECTION, SOLUTION INTRAVENOUS
Status: DISCONTINUED | OUTPATIENT
Start: 2018-08-23 | End: 2018-08-23

## 2018-08-23 RX ORDER — FENTANYL CITRATE 50 UG/ML
25 INJECTION, SOLUTION INTRAMUSCULAR; INTRAVENOUS EVERY 5 MIN PRN
Status: DISCONTINUED | OUTPATIENT
Start: 2018-08-23 | End: 2018-08-23 | Stop reason: HOSPADM

## 2018-08-23 RX ORDER — GLYCOPYRROLATE 0.2 MG/ML
INJECTION INTRAMUSCULAR; INTRAVENOUS
Status: DISCONTINUED | OUTPATIENT
Start: 2018-08-23 | End: 2018-08-23

## 2018-08-23 RX ORDER — LIDOCAINE HCL/PF 100 MG/5ML
SYRINGE (ML) INTRAVENOUS
Status: DISCONTINUED | OUTPATIENT
Start: 2018-08-23 | End: 2018-08-23

## 2018-08-23 RX ORDER — FENTANYL CITRATE 50 UG/ML
INJECTION, SOLUTION INTRAMUSCULAR; INTRAVENOUS
Status: DISCONTINUED | OUTPATIENT
Start: 2018-08-23 | End: 2018-08-23

## 2018-08-23 RX ORDER — SODIUM CHLORIDE 0.9 % (FLUSH) 0.9 %
3 SYRINGE (ML) INJECTION
Status: DISCONTINUED | OUTPATIENT
Start: 2018-08-23 | End: 2018-08-23 | Stop reason: HOSPADM

## 2018-08-23 RX ORDER — ONDANSETRON HYDROCHLORIDE 2 MG/ML
INJECTION, SOLUTION INTRAMUSCULAR; INTRAVENOUS
Status: DISCONTINUED | OUTPATIENT
Start: 2018-08-23 | End: 2018-08-23

## 2018-08-23 RX ORDER — HYDROCODONE BITARTRATE AND ACETAMINOPHEN 5; 325 MG/1; MG/1
1 TABLET ORAL EVERY 6 HOURS PRN
Qty: 30 TABLET | Refills: 0 | Status: SHIPPED | OUTPATIENT
Start: 2018-08-23 | End: 2018-08-23 | Stop reason: SDUPTHER

## 2018-08-23 RX ADMIN — ROCURONIUM BROMIDE 50 MG: 10 INJECTION, SOLUTION INTRAVENOUS at 01:08

## 2018-08-23 RX ADMIN — MUPIROCIN 1 G: 20 OINTMENT TOPICAL at 01:08

## 2018-08-23 RX ADMIN — FENTANYL CITRATE 100 MCG: 50 INJECTION, SOLUTION INTRAMUSCULAR; INTRAVENOUS at 01:08

## 2018-08-23 RX ADMIN — ROCURONIUM BROMIDE 10 MG: 10 INJECTION, SOLUTION INTRAVENOUS at 02:08

## 2018-08-23 RX ADMIN — GLYCOPYRROLATE 0.6 MG: 0.2 INJECTION, SOLUTION INTRAMUSCULAR; INTRAVENOUS at 03:08

## 2018-08-23 RX ADMIN — HYDROCODONE BITARTRATE AND ACETAMINOPHEN 1 TABLET: 5; 325 TABLET ORAL at 04:08

## 2018-08-23 RX ADMIN — PROPOFOL 200 MG: 10 INJECTION, EMULSION INTRAVENOUS at 01:08

## 2018-08-23 RX ADMIN — NEOSTIGMINE METHYLSULFATE 3 MG: 1 INJECTION INTRAVENOUS at 03:08

## 2018-08-23 RX ADMIN — CEFAZOLIN 2 G: 330 INJECTION, POWDER, FOR SOLUTION INTRAMUSCULAR; INTRAVENOUS at 02:08

## 2018-08-23 RX ADMIN — SODIUM CHLORIDE: 0.9 INJECTION, SOLUTION INTRAVENOUS at 01:08

## 2018-08-23 RX ADMIN — ONDANSETRON 4 MG: 2 INJECTION, SOLUTION INTRAMUSCULAR; INTRAVENOUS at 03:08

## 2018-08-23 RX ADMIN — MIDAZOLAM HYDROCHLORIDE 2 MG: 1 INJECTION, SOLUTION INTRAMUSCULAR; INTRAVENOUS at 01:08

## 2018-08-23 RX ADMIN — LIDOCAINE HYDROCHLORIDE 60 MG: 20 INJECTION, SOLUTION INTRAVENOUS at 01:08

## 2018-08-23 NOTE — ANESTHESIA PREPROCEDURE EVALUATION
08/23/2018  Earnest Morgan is a 53 y.o., male.    Anesthesia Evaluation    I have reviewed the Patient Summary Reports.    I have reviewed the Nursing Notes.      Review of Systems  Anesthesia Hx:  No problems with previous Anesthesia    Cardiovascular:   Hypertension    Neurological:   Seizures        Physical Exam  General:  Well nourished    Airway/Jaw/Neck:  Airway Findings: Mouth Opening: Normal Tongue: Normal  Mallampati: I  TM Distance: Normal, at least 6 cm  Jaw/Neck Findings:  Neck ROM: Normal ROM     Eyes/Ears/Nose:  Eyes/Ears/Nose Findings:    Dental:  Dental Findings: Periodontal disease, Severe    Chest/Lungs:  Chest/Lungs Findings: Normal Respiratory Rate     Heart/Vascular:  Heart Findings: Rate: Normal  Rhythm: Regular Rhythm        Mental Status:  Mental Status Findings:  Cooperative, Alert and Oriented         Anesthesia Plan  Type of Anesthesia, risks & benefits discussed:  Anesthesia Type:  general  Patient's Preference: General  Intra-op Monitoring Plan: standard ASA monitors  Intra-op Monitoring Plan Comments:   Post Op Pain Control Plan:   Post Op Pain Control Plan Comments:   Induction:   IV  Beta Blocker:  Patient is not currently on a Beta-Blocker (No further documentation required).       Informed Consent: Patient understands risks and agrees with Anesthesia plan.  Questions answered. Anesthesia consent signed with patient.  ASA Score: 3     Day of Surgery Review of History & Physical:    H&P update referred to the surgeon.         Ready For Surgery From Anesthesia Perspective.

## 2018-08-23 NOTE — INTERVAL H&P NOTE
The patient has been examined and the H&P has been reviewed:    I concur with the findings and no changes have occurred since H&P was written.    Anesthesia/Surgery risks, benefits and alternative options discussed and understood by patient/family.          Active Hospital Problems    Diagnosis  POA    Epilepsy [G40.909]  Yes      Resolved Hospital Problems   No resolved problems to display.

## 2018-08-23 NOTE — DISCHARGE INSTRUCTIONS
Having Vagus Nerve Stimulator Placement  A vagus nerve stimulator (VNS) is a device that can help prevent seizures. It can also treat depression. It has 2 parts: a pulse generator and leads. The pulse generator sits in your chest under your skin. The leads run from the generator to a vagus nerve in your neck. The VNS is put in your body during a surgery.  What to tell your health care provider  Before your surgery, tell your health care provider:  · What medicines you take. This includes over-the-counter medicines such as ibuprofen. It also includes vitamins, herbs, and other supplements. You may need to stop taking some medicines before the procedure, such as blood thinners and aspirin.  · If you smoke. You may need to stop before your surgery. Smoking can delay healing. Talk with your health care provider if you need help to stop smoking.  · If youve had recent changes in your health. This includes an infection or fever.  · If you are sensitive or allergic to anything. This includes medicines, latex, tape, and anesthetic medicines.  · If you are pregnant. Also tell your health care provider if you think you may be pregnant.  Getting ready for your surgery  Make sure to:  · Ask a family member or friend to take you home from the hospital  · Make plans for some help at home while you recover  · Follow all other instructions from your health care provider  · Read the consent form and ask questions if something is not clear  · Not eat or drink after midnight before your surgery  Tests before your surgery  Before your surgery, you may need tests such as:  · Chest X-ray  · Electrocardiogram (ECG), to check the heart rhythm  · Blood tests, to check your general health  · Video electroencephalogram, to see where seizures occur in your brain  On the day of your surgery  Your procedure will be done by a brain surgeon (neurosurgeon), a vascular surgeon, or a doctor who treats ear, nose, and throat conditions  (otolaryngologist). He or she will work with a team of specialized nurses. The surgery can be done in several ways. Ask your doctor about the details of your surgery. The whole procedure may take a couple of hours. In general, you can expect the following:  · You will have general anesthesia, a medicine that allows you to sleep through the surgery. You wont feel any pain during the surgery.  · A health care provider will watch your heart rate, blood pressure, and other vital signs during the surgery.  · You may be given antibiotics during and after the surgery. This is to help prevent infection.  · The surgical team will clean the left side of your neck and chest. The surgeon will make a small cut (incision) in each spot.  · The surgeon will move your neck tissues and muscles out of the way so the he or she can find your left vagus nerve.  · The surgeon will create a small pocket between the muscles and skin of your chest. He or she will then create a tunnel. The leads will be passed through the tunnel. He or she will wrap the ends of the leads around your left vagus nerve.  · Once the leads are connected to the pulse generator, the surgical team will test the VNS.  · The surgeon will put the pulse generator in the pocket in your chest.  · The surgeon will close the skin with stitches (sutures). A bandage will be put on the area.  After your surgery  After the surgery, you will spend several hours in a recovery room. You may be sleepy and confused when you wake up. Your health care team will watch your heart rate, breathing, and other vital signs. Youll be given pain medicine if you need it.  You can resume a normal diet as soon as you are able. You will likely go home that day once you are feeling better. The surgeon may give you a prescription for pain medicine.  Recovering at home  Follow all the instructions your health care provider gives you for medicines, exercise, diet, and wound care. Keep an eye on your  incisions as they heal. You may notice a small amount of fluid leaking from them. This is normal during the first few days.  Follow-up care  Make sure to keep all your follow-up appointments. This is so your doctor can check your progress. About 2 weeks after your surgery, your doctor will turn on your VNS. Once its turned on, your doctor can change the settings at follow-up visits if needed.  Managing your VNS  Talk with your doctor about how best to use your VNS. You will need to:  · Use a magnet if instructed. If you have auras before your seizures, you may be able to use a magnet to activate your VNS. This may alter or prevent a seizure from happening. Auras are symptoms that occur before a seizure.  · Avoid certain things. Talk with your doctor about what to avoid once your VNS is in place. You may not be able to have some types of MRI tests. You may need to avoid heat treatments that use shortwaves or microwaves.  · Take your medicines as instructed. You will continue to need medicines to control your seizures. But if the VNS works well for you, your doctor may lower your dose. Or you may be able to stop taking certain medicines.  · Have the battery replaced when needed. Over time, the battery's power will drain. Eventually you will need to have the battery of your VNS replaced. This can usually be done as an outpatient procedure using the existing leads. It is important to replace the battery before it loses power.  You may notice that your seizure control continues to improve over the next several years. You may also notice improvements in your mood. You may feel more alert and less sleepy during the day.     When to call your health care provider  Call your health care provider right if you have any of the below:  · Redness, swelling, or fluid leaking from your incision that gets worse  · High fever  · Severe pain      Date Last Reviewed: 6/16/2015  © 7944-1168 [employer]. [address] [place] [address]  Road, [place] [address]. All rights reserved. This information is not intended as a substitute for professional medical care. Always follow your healthcare professional's instructions.        Discharge Instructions: After Your Surgery  Youve just had surgery. During surgery, you were given medicine called anesthesia to keep you relaxed and free of pain. After surgery, you may have some pain or nausea. This is common. Here are some tips for feeling better and getting well after surgery.     Stay on schedule with your medicine.   Going home  Your healthcare provider will show you how to take care of yourself when you go home. He or she will also answer your questions. Have an adult family member or friend drive you home. For the first 24 hours after your surgery:  · Do not drive or use heavy equipment.  · Do not make important decisions or sign legal papers.  · Do not drink alcohol.  · Have someone stay with you, if needed. He or she can watch for problems and help keep you safe.  Be sure to go to all follow-up visits with your healthcare provider. And rest after your surgery for as long as your healthcare provider tells you to.  Coping with pain  If you have pain after surgery, pain medicine will help you feel better. Take it as told, before pain becomes severe. Also, ask your healthcare provider or pharmacist about other ways to control pain. This might be with heat, ice, or relaxation. And follow any other instructions your surgeon or nurse gives you.  Tips for taking pain medicine  To get the best relief possible, remember these points:  · Pain medicines can upset your stomach. Taking them with a little food may help.  · Most pain relievers taken by mouth need at least 20 to 30 minutes to start to work.  · Taking medicine on a schedule can help you remember to take it. Try to time your medicine so that you can take it before starting an activity. This might be before you get dressed, go for a walk, or sit down  for dinner.  · Constipation is a common side effect of pain medicines. Call your healthcare provider before taking any medicines such as laxatives or stool softeners to help ease constipation. Also ask if you should skip any foods. Drinking lots of fluids and eating foods such as fruits and vegetables that are high in fiber can also help. Remember, do not take laxatives unless your surgeon has prescribed them.  · Drinking alcohol and taking pain medicine can cause dizziness and slow your breathing. It can even be deadly. Do not drink alcohol while taking pain medicine.  · Pain medicine can make you react more slowly to things. Do not drive or run machinery while taking pain medicine.  Your healthcare provider may tell you to take acetaminophen to help ease your pain. Ask him or her how much you are supposed to take each day. Acetaminophen or other pain relievers may interact with your prescription medicines or other over-the-counter (OTC) medicines. Some prescription medicines have acetaminophen and other ingredients. Using both prescription and OTC acetaminophen for pain can cause you to overdose. Read the labels on your OTC medicines with care. This will help you to clearly know the list of ingredients, how much to take, and any warnings. It may also help you not take too much acetaminophen. If you have questions or do not understand the information, ask your pharmacist or healthcare provider to explain it to you before you take the OTC medicine.  Managing nausea  Some people have an upset stomach after surgery. This is often because of anesthesia, pain, or pain medicine, or the stress of surgery. These tips will help you handle nausea and eat healthy foods as you get better. If you were on a special food plan before surgery, ask your healthcare provider if you should follow it while you get better. These tips may help:  · Do not push yourself to eat. Your body will tell you when to eat and how much.  · Start off  with clear liquids and soup. They are easier to digest.  · Next try semi-solid foods, such as mashed potatoes, applesauce, and gelatin, as you feel ready.  · Slowly move to solid foods. Dont eat fatty, rich, or spicy foods at first.  · Do not force yourself to have 3 large meals a day. Instead eat smaller amounts more often.  · Take pain medicines with a small amount of solid food, such as crackers or toast, to avoid nausea.     Call your surgeon if  · You still have pain an hour after taking medicine. The medicine may not be strong enough.  · You feel too sleepy, dizzy, or groggy. The medicine may be too strong.  · You have side effects like nausea, vomiting, or skin changes, such as rash, itching, or hives.       If you have obstructive sleep apnea  You were given anesthesia medicine during surgery to keep you comfortable and free of pain. After surgery, you may have more apnea spells because of this medicine and other medicines you were given. The spells may last longer than usual.   At home:  · Keep using the continuous positive airway pressure (CPAP) device when you sleep. Unless your healthcare provider tells you not to, use it when you sleep, day or night. CPAP is a common device used to treat obstructive sleep apnea.  · Talk with your provider before taking any pain medicine, muscle relaxants, or sedatives. Your provider will tell you about the possible dangers of taking these medicines.  Date Last Reviewed: 12/1/2016  © 2149-0655 [employer]. [address] [place] [address], [place] [address]. All rights reserved. This information is not intended as a substitute for professional medical care. Always follow your healthcare professional's instructions.

## 2018-08-23 NOTE — TRANSFER OF CARE
Anesthesia Transfer of Care Note    Patient: Earnest Morgan    Procedure(s) Performed: Procedure(s) (LRB):  INSERTION, NEUROSTIMULATOR, VAGAL (Left)    Patient location: PACU    Anesthesia Type: general    Transport from OR: Transported from OR on 6-10 L/min O2 by face mask with adequate spontaneous ventilation    Post pain: adequate analgesia    Post assessment: no apparent anesthetic complications    Post vital signs: stable    Level of consciousness: awake    Nausea/Vomiting: no nausea/vomiting    Complications: none    Transfer of care protocol was followed      Last vitals:   Visit Vitals  BP (!) 145/80 (BP Location: Left arm, Patient Position: Lying)   Pulse 66   Temp 36.7 °C (98 °F) (Oral)   Resp 18   Ht 6' (1.829 m)   Wt 89.8 kg (198 lb)   SpO2 98%   BMI 26.85 kg/m²

## 2018-08-23 NOTE — PLAN OF CARE
Patient and patient's spouse and son received discharge instructions and prescriptions from MARLON Fajardo RN.  Patient and patient's spouse and son verbalized understanding of all instructions given and all questions were addressed prior to patient's discharge.  Patient's vital signs are stable and within patient's baseline.  Patient tolerated clear liquids PO.  Patient voided without difficulty in post-op.  Patient denies pain.  Patient denies nausea and vomiting at this time.  Patient meets all criteria for discharge at this time.  All required consents present in patient's chart upon patient's discharge.

## 2018-08-23 NOTE — DISCHARGE SUMMARY
[facility]  Discharge Note  Short Stay    Admit Date: 8/23/2018    Discharge Date and Time: No discharge date for patient encounter.     Attending Physician: Yair Adam MD     Discharge Provider: Raeann Stewart    Diagnoses:  Active Hospital Problems    Diagnosis  POA    *Epilepsy [G40.909]  Yes      Resolved Hospital Problems   No resolved problems to display.       Discharged Condition: stable    Hospital Course: Patient was admitted for an outpatient procedure and tolerated the procedure well with no complications.    Final Diagnoses: Same as principal problem.    Disposition: Home or Self Care    Follow up/Patient Instructions:  You may shower in 5 days. Keep incision open to air. Keep incision clean, dry. You will follow up for a wound check in 2 weeks.     Medications:  Reconciled Home Medications:      Medication List      START taking these medications    cephALEXin 125 mg/5 mL Susr  Commonly known as:  KEFLEX  Take 10 mLs (250 mg total) by mouth every 6 (six) hours. for 5 days     HYDROcodone-acetaminophen 5-325 mg per tablet  Commonly known as:  NORCO  Take 1 tablet by mouth every 6 (six) hours as needed for Pain.        CONTINUE taking these medications    amLODIPine 10 MG tablet  Commonly known as:  NORVASC  Take 10 mg by mouth every morning.     lacosamide 200 mg Tab tablet  Commonly known as:  VIMPAT  Take 1 tablet (200 mg total) by mouth 2 (two) times daily.     lamoTRIgine 100 MG tablet  Commonly known as:  LAMICTAL  Pt to take 1 tablet PO BID     VITAMIN B-12 ORAL  Take 500 mcg by mouth every morning.        STOP taking these medications    naproxen sodium 220 MG tablet  Commonly known as:  ANAPROX          Discharge Procedure Orders   Diet Adult Regular     Sponge bath only until clinic visit     Notify your health care provider if you experience any of the following:  temperature >100.4     Notify your health care provider if you experience any of the following:  persistent nausea  and vomiting or diarrhea     Notify your health care provider if you experience any of the following:  severe uncontrolled pain     Notify your health care provider if you experience any of the following:  redness, tenderness, or signs of infection (pain, swelling, redness, odor or green/yellow discharge around incision site)     Notify your health care provider if you experience any of the following:  difficulty breathing or increased cough     Notify your health care provider if you experience any of the following:  severe persistent headache     Notify your health care provider if you experience any of the following:  worsening rash     Notify your health care provider if you experience any of the following:  persistent dizziness, light-headedness, or visual disturbances     Notify your health care provider if you experience any of the following:  increased confusion or weakness     No dressing needed     Activity as tolerated     Weight bearing restrictions (specify):   Scheduling Instructions: No lifting more than 10lbs.     Follow-up Information     [facility] 7th Fl In 2 weeks.    Specialty:  Neurosurgery  Why:  For wound re-check  Contact information:  [address]  [facility] [address] [phone]  Additional information:  7th Floor                 Discharge Procedure Orders (must include Diet, Follow-up, Activity):   Discharge Procedure Orders (must include Diet, Follow-up, Activity)   Diet Adult Regular     Sponge bath only until clinic visit     Notify your health care provider if you experience any of the following:  temperature >100.4     Notify your health care provider if you experience any of the following:  persistent nausea and vomiting or diarrhea     Notify your health care provider if you experience any of the following:  severe uncontrolled pain     Notify your health care provider if you experience any of the following:  redness, tenderness, or signs of  infection (pain, swelling, redness, odor or green/yellow discharge around incision site)     Notify your health care provider if you experience any of the following:  difficulty breathing or increased cough     Notify your health care provider if you experience any of the following:  severe persistent headache     Notify your health care provider if you experience any of the following:  worsening rash     Notify your health care provider if you experience any of the following:  persistent dizziness, light-headedness, or visual disturbances     Notify your health care provider if you experience any of the following:  increased confusion or weakness     No dressing needed     Activity as tolerated     Weight bearing restrictions (specify):   Scheduling Instructions: No lifting more than 10lbs.

## 2018-08-24 NOTE — ANESTHESIA POSTPROCEDURE EVALUATION
Anesthesia Post Evaluation    Patient: Earnest Morgan    Procedure(s) Performed: Procedure(s) (LRB):  INSERTION, NEUROSTIMULATOR, VAGAL (Left)    Final Anesthesia Type: general  Patient location during evaluation: PACU  Patient participation: Yes- Able to Participate  Level of consciousness: awake and alert  Post-procedure vital signs: reviewed and stable  Pain management: adequate  Airway patency: patent  PONV status at discharge: No PONV  Anesthetic complications: no      Cardiovascular status: blood pressure returned to baseline  Respiratory status: unassisted, spontaneous ventilation and room air  Hydration status: euvolemic  Follow-up not needed.        Visit Vitals  /88 (BP Location: Right arm, Patient Position: Lying)   Pulse (!) 59   Temp 37.1 °C (98.8 °F) (Temporal)   Resp 16   Ht 6' (1.829 m)   Wt 89.8 kg (198 lb)   SpO2 100%   BMI 26.85 kg/m²       Pain/Larry Score: Pain Assessment Performed: Yes (8/23/2018  5:30 PM)  Presence of Pain: denies (8/23/2018  5:30 PM)  Pain Rating Prior to Med Admin: 4 (8/23/2018  4:20 PM)  Pain Rating Post Med Admin: 0 (8/23/2018  4:40 PM)  Larry Score: 10 (8/23/2018  5:30 PM)

## 2018-08-27 NOTE — OP NOTE
"DATE OF PROCEDURE:  08/23/2018.    PREOPERATIVE DIAGNOSIS:  Intractable partial complex epilepsy.    POSTOPERATIVE DIAGNOSIS:  Intractable partial complex epilepsy.    OPERATIVE PROCEDURE UNDERGONE:  Placement of a vagal nerve stimulator lead and   pulse generator with microsurgical technique.    SURGEON:  Yair Adam M.D.    :  Dr. Raeann Larson.    ANESTHESIA:  General.    INDICATIONS FOR PROCEDURE:  This is a 53-year-old male with complex partial,   intractable epilepsy who had failed medical and conservative therapy who we felt   would benefit from the vagal nerve stimulation.    OPERATIVE NOTE:  The patient was taken to the operating room, anesthetized and   intubated by Anesthesia.  Preop antibiotics were administered.  The patient was   placed in the supine position.  Head was turned slightly to the right.  Neck and   chest area were prepped and draped in sterile fashion.  We made a transverse   incision over the left side of the neck over one of the neck creases, dissected   down through the platysmas, found in the sternocleidomastoid and omohyoid, found   the carotid sheath and its contents, opened that up and was able to localize   "the vagus nerve between the carotid artery and the jugular vein."  I was able   to dissect a fair length of it free and placed Vesseloop around it.  I then   turned our attention to the chest, made a separate incision under the clavicle,   dissected down and made a pocket for the pulse generator, passed the leads up to   the neck, then brought in a microscope.  Under microsurgical technique, we   looped in all three leads around the vagus nerve without any issues.  I placed a   retention stitch in the neck area with pledgets, hooked it up to the pulse   generator, interrogated the leads then programmed the pulse generator to the   appropriate level decided on by the Epilepsy team.  We irrigated out the neck and   chest wound, closed the wound in layers and " sterile dressing was placed.  The   patient was extubated and brought to the recovery room without any complication.    EBL was minimum.  Specimen sent was none.        /mikey [id number] blank(s)        JERRY/DYLON  dd: 08/26/2018 20:07:44 (CDT)  td: 08/26/2018 22:36:11 (CDT)  Doc ID   #[id number]  Job ID #[id number]    CC:

## 2018-09-04 DIAGNOSIS — G40.219 COMPLEX PARTIAL EPILEPSY WITH GENERALIZATION AND WITH INTRACTABLE EPILEPSY: ICD-10-CM

## 2018-09-04 LAB — DAT IGG-SP REAG RBC-IMP: NORMAL

## 2018-09-04 RX ORDER — LAMOTRIGINE 100 MG/1
TABLET ORAL
Qty: 60 TABLET | Refills: 2 | Status: SHIPPED | OUTPATIENT
Start: 2018-09-04 | End: 2018-11-29 | Stop reason: SDUPTHER

## 2018-09-05 ENCOUNTER — OFFICE VISIT (OUTPATIENT)
Dept: NEUROLOGY | Facility: CLINIC | Age: 53
End: 2018-09-05
Payer: COMMERCIAL

## 2018-09-05 VITALS
BODY MASS INDEX: 26.85 KG/M2 | DIASTOLIC BLOOD PRESSURE: 85 MMHG | HEART RATE: 63 BPM | HEIGHT: 72 IN | SYSTOLIC BLOOD PRESSURE: 136 MMHG

## 2018-09-05 DIAGNOSIS — G40.219 COMPLEX PARTIAL EPILEPSY WITH GENERALIZATION AND WITH INTRACTABLE EPILEPSY: Primary | ICD-10-CM

## 2018-09-05 DIAGNOSIS — Z96.89 S/P PLACEMENT OF VNS (VAGUS NERVE STIMULATION) DEVICE: ICD-10-CM

## 2018-09-05 DIAGNOSIS — G40.909 SEIZURE DISORDER: ICD-10-CM

## 2018-09-05 PROCEDURE — 99999 PR PBB SHADOW E&M-EST. PATIENT-LVL III: CPT | Mod: PBBFAC,,, | Performed by: PSYCHIATRY & NEUROLOGY

## 2018-09-05 PROCEDURE — 3075F SYST BP GE 130 - 139MM HG: CPT | Mod: CPTII,S$GLB,, | Performed by: PSYCHIATRY & NEUROLOGY

## 2018-09-05 PROCEDURE — 3008F BODY MASS INDEX DOCD: CPT | Mod: CPTII,S$GLB,, | Performed by: PSYCHIATRY & NEUROLOGY

## 2018-09-05 PROCEDURE — 99214 OFFICE O/P EST MOD 30 MIN: CPT | Mod: S$GLB,,, | Performed by: PSYCHIATRY & NEUROLOGY

## 2018-09-05 PROCEDURE — 95974 PR COMPLEX CRANIAL NEUROSTIM,1ST HOUR: CPT | Mod: S$GLB,,, | Performed by: PSYCHIATRY & NEUROLOGY

## 2018-09-05 PROCEDURE — 3079F DIAST BP 80-89 MM HG: CPT | Mod: CPTII,S$GLB,, | Performed by: PSYCHIATRY & NEUROLOGY
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
## 2018-09-06 ENCOUNTER — CLINICAL SUPPORT (OUTPATIENT)
Dept: NEUROSURGERY | Facility: CLINIC | Age: 53
End: 2018-09-06
Payer: COMMERCIAL

## 2018-09-06 VITALS
SYSTOLIC BLOOD PRESSURE: 145 MMHG | WEIGHT: 196.13 LBS | HEART RATE: 66 BPM | BODY MASS INDEX: 26.6 KG/M2 | DIASTOLIC BLOOD PRESSURE: 91 MMHG | TEMPERATURE: 98 F

## 2018-09-06 PROCEDURE — 99999 PR PBB SHADOW E&M-EST. PATIENT-LVL III: CPT | Mod: PBBFAC,,,

## 2018-09-06 RX ORDER — CEPHALEXIN 250 MG/5ML
POWDER, FOR SUSPENSION ORAL
Refills: 0 | COMMUNITY
Start: 2018-08-24 | End: 2019-03-13 | Stop reason: ALTCHOICE

## 2018-09-06 NOTE — PROGRESS NOTES
Patient seen in clinic for 2 week post op s/p VNS with Dr Adam on _08/23/2018       Incisions on head and neck assessed, dermabond used for closure no swelling or purulent drainage, edges well approximated.          Patient was instructed as follows:    Discontinue Bacitracin after tonight.   May shower normally but pat dry after shower.   Do not submerge wound in bath tub or go swimming until released by the physician   Keep incision clean, dry and open to air as much as possible.   Patient encouraged to walk as much as possible but advised to walk with family member or friend and rest as necessary.   No lifting >10lbs with affected extremity   Avoid bending and twisting the area of your surgery more than 45 degrees from neutral position in any direction.   Return to work will be determined on an individual basis.    A copy of post-operative instructions provided to the patient.  Saw Dr Castrejon yesterday who turned on VNS and adjusted    All questions were answered. Patient does not wish to follow up with DR Adam , wants Dr Castrejon to take over completely at this point. I advised to call if they had any questions or concerns. Patient was encouraged to call clinic with any future concerns prior to follow up appt. If any worsening symptoms, patient should report to ED.

## 2018-09-18 ENCOUNTER — OFFICE VISIT (OUTPATIENT)
Dept: NEUROLOGY | Facility: CLINIC | Age: 53
End: 2018-09-18
Payer: COMMERCIAL

## 2018-09-18 VITALS
SYSTOLIC BLOOD PRESSURE: 128 MMHG | DIASTOLIC BLOOD PRESSURE: 79 MMHG | HEIGHT: 72 IN | BODY MASS INDEX: 26.55 KG/M2 | RESPIRATION RATE: 20 BRPM | HEART RATE: 70 BPM | WEIGHT: 196 LBS

## 2018-09-18 DIAGNOSIS — Z96.89 S/P PLACEMENT OF VNS (VAGUS NERVE STIMULATION) DEVICE: ICD-10-CM

## 2018-09-18 DIAGNOSIS — G40.219 COMPLEX PARTIAL EPILEPSY WITH GENERALIZATION AND WITH INTRACTABLE EPILEPSY: Primary | ICD-10-CM

## 2018-09-18 PROCEDURE — 99999 PR PBB SHADOW E&M-EST. PATIENT-LVL III: CPT | Mod: PBBFAC,,, | Performed by: PSYCHIATRY & NEUROLOGY

## 2018-09-18 PROCEDURE — 95974 PR COMPLEX CRANIAL NEUROSTIM,1ST HOUR: CPT | Mod: S$GLB,,, | Performed by: PSYCHIATRY & NEUROLOGY

## 2018-09-18 PROCEDURE — 99499 UNLISTED E&M SERVICE: CPT | Mod: S$GLB,,, | Performed by: PSYCHIATRY & NEUROLOGY

## 2018-09-18 NOTE — LETTER
[facility]  [address] [place] [address]  Phone: [phone]  Fax: [phone] September 18, 2018    Earnest Morgan  [address]  [facility] [address]      To Whom It May Concern:    Earnest Morgan is unable to participate in jury duty due to recent surgery and need for frequent doctor's visits in the upcoming weeks to months.     If you have any questions or concerns, please feel free to call my office.    Sincerely,        Rach Fuller MD

## 2018-09-18 NOTE — PROGRESS NOTES
"    Date: 9/18/2018    Patient ID: Earnest Morgan is a 53 y.o. male.    Chief Complaint: Seizures      History of Present Illness:  Mr. Morgan is a 53 y.o. male with focal intractable epilepsy who presents for VNS adjustment. He is followed by Dr. Castrejon but I am seeing in his absence to increase the VNS settings.     He last saw Dr. Castrejon on 9/5/18 for his initial increase in the VNS settings. He returns today to increase again. He is tolerating it well. It feels "funny" and he notices it strain his voice a bit when it fires. No pain. No more dizzy spells.     He has been summoned to jury duty and requests a letter to be excused.     CURRENT VNS settings (interrogated on 9/18--before changes or increase today):  Output current (mA):               0.25  Signal frequency (Hz):            30  Pulse width (µs):                     500  Signal on time (s):                   30  Signal off time (min):               5  Magnet current (mA):              0.375  Magnet on time (s):                 60  Magnet pulse width (µs):        500  Autostim current (mA):            0.5  Autostim on time (s):               30  Autostim pulse width (µs):      500     System diagnostics:  Lead impedance:                    OK  Impedence value (?):             3860  Near end of service:                No    Review of Systems:   Comprehensive review of systems is negative except as mentioned in the HPI    Allergies:  Review of patient's allergies indicates:  No Known Allergies    Current Medications:  Current Outpatient Medications   Medication Sig Dispense Refill    amlodipine (NORVASC) 10 MG tablet Take 10 mg by mouth every morning.       cephALEXin (KEFLEX) 250 mg/5 mL suspension   0    CYANOCOBALAMIN, VITAMIN B-12, (VITAMIN B-12 ORAL) Take 500 mcg by mouth every morning.       HYDROcodone-acetaminophen (NORCO) 5-325 mg per tablet Take 1 tablet by mouth every 6 (six) hours as needed for Pain. 30 tablet 0    lacosamide (VIMPAT) " 200 mg Tab tablet Take 1 tablet (200 mg total) by mouth 2 (two) times daily. 60 tablet 1    lamoTRIgine (LAMICTAL) 100 MG tablet Pt to take 1 tablet PO BID 60 tablet 2    lamoTRIgine (LAMICTAL) 100 MG tablet TAKE 1 TABLET BY MOUTH TWO TIMES A DAY 60 tablet 2     No current facility-administered medications for this visit.      Facility-Administered Medications Ordered in Other Visits   Medication Dose Route Frequency Provider Last Rate Last Dose    mupirocin 2 % ointment   [facility]al On Call Procedure Raeann Man MD           Past Medical History:  Past Medical History:   Diagnosis Date    Hypertension     Loose, teeth     Seizures        Past Surgical History:  Past Surgical History:   Procedure Laterality Date    CHOLECYSTECTOMY      INSERTION, NEUROSTIMULATOR, VAGAL Left 8/23/2018    Performed by Yair Adam MD at [facility] OR [address] [place] [address]    VAGUS NERVE STIMULATOR INSERTION Left 8/23/2018    Procedure: INSERTION, NEUROSTIMULATOR, VAGAL;  Surgeon: Yair Adam MD;  Location: [facility] OR [address] [place] [address];  Service: Neurosurgery;  Laterality: Left;       Family History:  family history includes Cancer in his mother; Diabetes in his brother, father, mother, and sister; Heart attack in his father; Stroke in his sister.    Social History:   reports that  has never smoked. he has never used smokeless tobacco. He reports that he does not drink alcohol or use drugs.    Physical Exam:  Vitals:    09/18/18 1526   BP: 128/79   Pulse: 70   Resp: 20   Weight: 88.9 kg (196 lb)   Height: 6' (1.829 m)   PainSc: 0-No pain     Body mass index is 26.58 kg/m².  Mental status: Awake and alert  Speech language: No dysarthria or aphasia on conversation, voice strain when VNS fires  Skin: Wound in clean, dry, and intact, glue overlying wounds still in place partially.   Cranial nerves: Face symmetric  Motor: Moves all extremities well  Coordination: No ataxia. No tremor.         Data:  I have personally reviewed the referring provider's notes,  labs, & imaging made available to me today.       Labs:  CBC:   Lab Results   Component Value Date    WBC 7.54 08/23/2018    HGB 12.5 (L) 08/23/2018    HCT 38.1 (L) 08/23/2018     08/23/2018    MCV 88 08/23/2018    RDW 13.5 08/23/2018     BMP:   Lab Results   Component Value Date     08/23/2018    K 4.3 08/23/2018     08/23/2018    CO2 22 (L) 08/23/2018    BUN 20 08/23/2018    CREATININE 1.4 08/23/2018    GLU 82 08/23/2018    CALCIUM 9.1 08/23/2018     LFTS;   Lab Results   Component Value Date    PROT 7.6 07/24/2018    ALBUMIN 4.4 07/24/2018    BILITOT 0.5 07/24/2018    AST 19 07/24/2018    ALKPHOS 101 07/24/2018    ALT 22 07/24/2018     COAGS:   Lab Results   Component Value Date    INR 1.1 08/23/2018     FLP: No results found for: CHOL, HDL, LDLCALC, TRIG, CHOLHDL      Assessment and Plan:  Mr. Morgan is a 53 y.o. male with intractable focal epilepsy who returns for close interval followup to increase VNS settings.     We increased the VNS settings. The patient sat in the office and felt the increased output current and states it is not uncomfortable or painful. He did have straining of his voice and a slight cough. I asked him to monitor for side effects. He will be seen back in 2 weeks by Dr. Castrejon for additional adjustment. He should continue on his Lamital and Vimpat at the same doses.     I have provided him with a letter to excuse him from jury duty as he had recent surgery and needs frequent doctor visits in the upcoming weeks to months.     New VNS settings (increased output current, magnet current, and autostim current) as adjusted today are documented below:    VNS settings (increased on 9/18/18):  Output current (mA):               0.5  Signal frequency (Hz):            30  Pulse width (µs):                     500  Signal on time (s):                   30  Signal off time (min):               5  Magnet current (mA):              0.675  Magnet on time (s):                 60  Magnet  pulse width (µs):        500  Autostim current (mA):            0.75  Autostim on time (s):               30  Autostim pulse width (µs):      500     System diagnostics:  Lead impedance:                    OK  Impedence value (?):             3748  Near end of service:                No    Complex partial epilepsy with generalization and with intractable epilepsy    S/P placement of VNS (vagus nerve stimulation) device

## 2018-10-05 LAB — PATHOLOGIST INTERPRETATION AB/XM: NORMAL

## 2018-10-05 PROCEDURE — 86077 PHYS BLOOD BANK SERV XMATCH: CPT | Mod: ,,, | Performed by: PATHOLOGY

## 2018-10-17 ENCOUNTER — OFFICE VISIT (OUTPATIENT)
Dept: NEUROLOGY | Facility: CLINIC | Age: 53
End: 2018-10-17
Payer: COMMERCIAL

## 2018-10-17 VITALS
HEART RATE: 58 BPM | HEIGHT: 72 IN | BODY MASS INDEX: 26.67 KG/M2 | DIASTOLIC BLOOD PRESSURE: 89 MMHG | WEIGHT: 196.88 LBS | SYSTOLIC BLOOD PRESSURE: 145 MMHG | RESPIRATION RATE: 18 BRPM

## 2018-10-17 DIAGNOSIS — G40.219 COMPLEX PARTIAL EPILEPSY WITH GENERALIZATION AND WITH INTRACTABLE EPILEPSY: Primary | ICD-10-CM

## 2018-10-17 DIAGNOSIS — Z96.89 S/P PLACEMENT OF VNS (VAGUS NERVE STIMULATION) DEVICE: ICD-10-CM

## 2018-10-17 PROCEDURE — 3008F BODY MASS INDEX DOCD: CPT | Mod: CPTII,S$GLB,, | Performed by: PSYCHIATRY & NEUROLOGY

## 2018-10-17 PROCEDURE — 3077F SYST BP >= 140 MM HG: CPT | Mod: CPTII,S$GLB,, | Performed by: PSYCHIATRY & NEUROLOGY

## 2018-10-17 PROCEDURE — 95974 PR COMPLEX CRANIAL NEUROSTIM,1ST HOUR: CPT | Mod: S$GLB,,, | Performed by: PSYCHIATRY & NEUROLOGY

## 2018-10-17 PROCEDURE — 99212 OFFICE O/P EST SF 10 MIN: CPT | Mod: 25,S$GLB,, | Performed by: PSYCHIATRY & NEUROLOGY

## 2018-10-17 PROCEDURE — 99999 PR PBB SHADOW E&M-EST. PATIENT-LVL III: CPT | Mod: PBBFAC,,, | Performed by: PSYCHIATRY & NEUROLOGY

## 2018-10-17 PROCEDURE — 3079F DIAST BP 80-89 MM HG: CPT | Mod: CPTII,S$GLB,, | Performed by: PSYCHIATRY & NEUROLOGY
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
## 2018-10-31 ENCOUNTER — TELEPHONE (OUTPATIENT)
Dept: NEUROLOGY | Facility: CLINIC | Age: 53
End: 2018-10-31

## 2018-10-31 ENCOUNTER — OFFICE VISIT (OUTPATIENT)
Dept: NEUROLOGY | Facility: CLINIC | Age: 53
End: 2018-10-31
Payer: COMMERCIAL

## 2018-10-31 VITALS
SYSTOLIC BLOOD PRESSURE: 131 MMHG | RESPIRATION RATE: 18 BRPM | BODY MASS INDEX: 26.7 KG/M2 | DIASTOLIC BLOOD PRESSURE: 86 MMHG | HEART RATE: 68 BPM | WEIGHT: 196.88 LBS

## 2018-10-31 DIAGNOSIS — M85.80 OSTEOPENIA, UNSPECIFIED LOCATION: ICD-10-CM

## 2018-10-31 DIAGNOSIS — Z96.89 S/P PLACEMENT OF VNS (VAGUS NERVE STIMULATION) DEVICE: ICD-10-CM

## 2018-10-31 DIAGNOSIS — G40.219 COMPLEX PARTIAL EPILEPSY WITH GENERALIZATION AND WITH INTRACTABLE EPILEPSY: Primary | ICD-10-CM

## 2018-10-31 PROCEDURE — 99999 PR PBB SHADOW E&M-EST. PATIENT-LVL III: CPT | Mod: PBBFAC,,, | Performed by: PSYCHIATRY & NEUROLOGY

## 2018-10-31 PROCEDURE — 95974 PR COMPLEX CRANIAL NEUROSTIM,1ST HOUR: CPT | Mod: S$GLB,,, | Performed by: PSYCHIATRY & NEUROLOGY

## 2018-10-31 PROCEDURE — 3075F SYST BP GE 130 - 139MM HG: CPT | Mod: CPTII,S$GLB,, | Performed by: PSYCHIATRY & NEUROLOGY

## 2018-10-31 PROCEDURE — 3008F BODY MASS INDEX DOCD: CPT | Mod: CPTII,S$GLB,, | Performed by: PSYCHIATRY & NEUROLOGY

## 2018-10-31 PROCEDURE — 99212 OFFICE O/P EST SF 10 MIN: CPT | Mod: 25,S$GLB,, | Performed by: PSYCHIATRY & NEUROLOGY

## 2018-10-31 PROCEDURE — 3079F DIAST BP 80-89 MM HG: CPT | Mod: CPTII,S$GLB,, | Performed by: PSYCHIATRY & NEUROLOGY
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
## 2018-10-31 NOTE — TELEPHONE ENCOUNTER
Spoke with wife to see if patient still considering surgery since he had VNS placed. Wife stated that he has not had a seizure and is doing well. Not considering surgery.

## 2018-11-29 DIAGNOSIS — G40.219 COMPLEX PARTIAL EPILEPSY WITH GENERALIZATION AND WITH INTRACTABLE EPILEPSY: ICD-10-CM

## 2018-11-29 RX ORDER — LAMOTRIGINE 100 MG/1
100 TABLET ORAL 2 TIMES DAILY
Qty: 60 TABLET | Refills: 6 | Status: SHIPPED | OUTPATIENT
Start: 2018-11-29 | End: 2020-01-03

## 2019-01-04 DIAGNOSIS — G40.219 COMPLEX PARTIAL EPILEPSY WITH GENERALIZATION AND WITH INTRACTABLE EPILEPSY: ICD-10-CM

## 2019-01-04 RX ORDER — LAMOTRIGINE 100 MG/1
TABLET ORAL
Qty: 60 TABLET | Refills: 2 | Status: SHIPPED | OUTPATIENT
Start: 2019-01-04 | End: 2019-03-13 | Stop reason: SDUPTHER

## 2019-01-28 DIAGNOSIS — G40.219 COMPLEX PARTIAL EPILEPSY WITH GENERALIZATION AND WITH INTRACTABLE EPILEPSY: ICD-10-CM

## 2019-01-28 RX ORDER — LAMOTRIGINE 100 MG/1
100 TABLET ORAL 2 TIMES DAILY
Qty: 60 TABLET | Refills: 5 | Status: SHIPPED | OUTPATIENT
Start: 2019-01-28 | End: 2019-03-13 | Stop reason: SDUPTHER

## 2019-01-28 RX ORDER — LACOSAMIDE 200 MG/1
200 TABLET ORAL 2 TIMES DAILY
Qty: 60 TABLET | Refills: 5 | Status: SHIPPED | OUTPATIENT
Start: 2019-01-28 | End: 2019-02-04 | Stop reason: SDUPTHER

## 2019-01-28 NOTE — TELEPHONE ENCOUNTER
----- Message from Beatrice Mccoy sent at 1/28/2019  3:19 PM CST -----  Contact: Ashlyn Morgan (Spouse)  Type:  RX Refill Request    Who Called:  Ashlyn Morgan (Spouse)  Refill or New Rx:  refill  RX Name and Strength:    1. lacosamide (VIMPAT) 200 mg Tab tablet  2. lamoTRIgine (LAMICTAL) 100 MG tablet    How is the patient currently taking it? (ex. 1XDay):    1.  Take 1 tablet (200 mg total) by mouth 2 (two) times daily. - Oral  2. TAKE 1 TABLET BY MOUTH TWO TIMES A DAY    Is this a 30 day or 90 day RX:  30  Preferred Pharmacy with phone number:    [facility] - [place], [place] - [address] [address] [address] [address]  [facility] [address]  Phone: [phone] Fax: [phone]    Local or Mail Order:  local  Ordering Provider:  Dr. Lucía Leon Call Back Number:  [phone]  Additional Information:  Patient is almost out of medication.

## 2019-02-04 ENCOUNTER — TELEPHONE (OUTPATIENT)
Dept: NEUROLOGY | Facility: CLINIC | Age: 54
End: 2019-02-04

## 2019-02-04 RX ORDER — LACOSAMIDE 200 MG/1
TABLET, FILM COATED ORAL
Qty: 60 TABLET | Refills: 5 | Status: SHIPPED | OUTPATIENT
Start: 2019-02-04 | End: 2019-07-02 | Stop reason: SDUPTHER

## 2019-02-04 NOTE — TELEPHONE ENCOUNTER
----- Message from Franco Barnhart sent at 2/4/2019  2:07 PM CST -----  Type:  Patient Returning Call    Who Called:  Patient  Who Left Message for Patient:  Hui  Does the patient know what this is regarding?:  No  Best Call Back Number:  [phone]

## 2019-02-04 NOTE — TELEPHONE ENCOUNTER
----- Message from Charity Fuller sent at 2/4/2019  9:26 AM CST -----  Type:  RX Refill Request    Who Called:  Ashlyn Morgan  Refill or New Rx:     RX Name and Strength:  lacosamide (VIMPAT) 200 mg Tab tablet  How is the patient currently taking it? (ex. 1XDay):     Is this a 30 day or 90 day RX:     Preferred Pharmacy with phone number:    [facility] [address] [address]  [address] [address]  [facility] [address]  Phone: [phone] Fax: [phone]    Local or Mail Order:     Ordering Provider:     Best Call Back Number:  [phone] (home)     Additional Information:  He took his last one today / and is requesting the refill asap ...

## 2019-03-13 ENCOUNTER — OFFICE VISIT (OUTPATIENT)
Dept: NEUROLOGY | Facility: CLINIC | Age: 54
End: 2019-03-13
Payer: COMMERCIAL

## 2019-03-13 VITALS
DIASTOLIC BLOOD PRESSURE: 79 MMHG | BODY MASS INDEX: 26.53 KG/M2 | HEIGHT: 72 IN | WEIGHT: 195.88 LBS | RESPIRATION RATE: 18 BRPM | HEART RATE: 63 BPM | SYSTOLIC BLOOD PRESSURE: 138 MMHG

## 2019-03-13 DIAGNOSIS — G40.219 COMPLEX PARTIAL EPILEPSY WITH GENERALIZATION AND WITH INTRACTABLE EPILEPSY: Primary | ICD-10-CM

## 2019-03-13 DIAGNOSIS — Z96.89 S/P PLACEMENT OF VNS (VAGUS NERVE STIMULATION) DEVICE: ICD-10-CM

## 2019-03-13 PROCEDURE — 3008F PR BODY MASS INDEX (BMI) DOCUMENTED: ICD-10-PCS | Mod: CPTII,S$GLB,, | Performed by: PSYCHIATRY & NEUROLOGY

## 2019-03-13 PROCEDURE — 99213 PR OFFICE/OUTPT VISIT, EST, LEVL III, 20-29 MIN: ICD-10-PCS | Mod: S$GLB,,, | Performed by: PSYCHIATRY & NEUROLOGY

## 2019-03-13 PROCEDURE — 99213 OFFICE O/P EST LOW 20 MIN: CPT | Mod: S$GLB,,, | Performed by: PSYCHIATRY & NEUROLOGY

## 2019-03-13 PROCEDURE — 95976 ALYS SMPL CN NPGT PRGRMG: CPT | Mod: S$GLB,,, | Performed by: PSYCHIATRY & NEUROLOGY

## 2019-03-13 PROCEDURE — 3075F SYST BP GE 130 - 139MM HG: CPT | Mod: CPTII,S$GLB,, | Performed by: PSYCHIATRY & NEUROLOGY

## 2019-03-13 PROCEDURE — 99999 PR PBB SHADOW E&M-EST. PATIENT-LVL III: CPT | Mod: PBBFAC,,, | Performed by: PSYCHIATRY & NEUROLOGY

## 2019-03-13 PROCEDURE — 3078F DIAST BP <80 MM HG: CPT | Mod: CPTII,S$GLB,, | Performed by: PSYCHIATRY & NEUROLOGY

## 2019-03-13 PROCEDURE — 3008F BODY MASS INDEX DOCD: CPT | Mod: CPTII,S$GLB,, | Performed by: PSYCHIATRY & NEUROLOGY

## 2019-03-13 PROCEDURE — 95976 PR ELEC ANALYSIS, IMPLT NEURO PULSE GEN, W/PRGRM, SMPL CRAN NERVE: ICD-10-PCS | Mod: S$GLB,,, | Performed by: PSYCHIATRY & NEUROLOGY

## 2019-03-13 PROCEDURE — 3075F PR MOST RECENT SYSTOLIC BLOOD PRESS GE 130-139MM HG: ICD-10-PCS | Mod: CPTII,S$GLB,, | Performed by: PSYCHIATRY & NEUROLOGY

## 2019-03-13 PROCEDURE — 3078F PR MOST RECENT DIASTOLIC BLOOD PRESSURE < 80 MM HG: ICD-10-PCS | Mod: CPTII,S$GLB,, | Performed by: PSYCHIATRY & NEUROLOGY

## 2019-03-13 PROCEDURE — 99999 PR PBB SHADOW E&M-EST. PATIENT-LVL III: ICD-10-PCS | Mod: PBBFAC,,, | Performed by: PSYCHIATRY & NEUROLOGY
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
## 2019-04-09 ENCOUNTER — TELEPHONE (OUTPATIENT)
Dept: NEUROLOGY | Facility: CLINIC | Age: 54
End: 2019-04-09

## 2019-04-09 NOTE — TELEPHONE ENCOUNTER
The patient had a death in the family and cannot make the appt tomorrow.  He can only come after 3 pm and his wife is having surgery on 4/26/2019.

## 2019-04-09 NOTE — TELEPHONE ENCOUNTER
----- Message from Ioana Andrade RT sent at 4/9/2019  3:56 PM CDT -----  Contact: Ashlyn,wife,[phone]   Ashlyn,wife,[phone], requesting to inform pt unable to attend appt tomorrow, due to death in [place], would like to be worked in 04/17/2019 or 04/24/2019, please call soon, thanks.

## 2019-04-25 ENCOUNTER — OFFICE VISIT (OUTPATIENT)
Dept: NEUROLOGY | Facility: CLINIC | Age: 54
End: 2019-04-25
Payer: COMMERCIAL

## 2019-04-25 VITALS
HEART RATE: 62 BPM | SYSTOLIC BLOOD PRESSURE: 151 MMHG | RESPIRATION RATE: 18 BRPM | WEIGHT: 198.88 LBS | BODY MASS INDEX: 26.98 KG/M2 | DIASTOLIC BLOOD PRESSURE: 90 MMHG

## 2019-04-25 DIAGNOSIS — G40.219 COMPLEX PARTIAL EPILEPSY WITH GENERALIZATION AND WITH INTRACTABLE EPILEPSY: ICD-10-CM

## 2019-04-25 DIAGNOSIS — Z96.89 S/P PLACEMENT OF VNS (VAGUS NERVE STIMULATION) DEVICE: Primary | ICD-10-CM

## 2019-04-25 PROCEDURE — 95977 PR ELEC ANALYSIS, IMPLT NEURO PULSE GEN, W/PRGRM, CMPLX CRAN NERVE: ICD-10-PCS | Mod: S$GLB,,, | Performed by: PSYCHIATRY & NEUROLOGY

## 2019-04-25 PROCEDURE — 95977 ALYS CPLX CN NPGT PRGRMG: CPT | Mod: S$GLB,,, | Performed by: PSYCHIATRY & NEUROLOGY

## 2019-04-25 PROCEDURE — 99499 UNLISTED E&M SERVICE: CPT | Mod: S$GLB,,, | Performed by: PSYCHIATRY & NEUROLOGY

## 2019-04-25 PROCEDURE — 99999 PR PBB SHADOW E&M-EST. PATIENT-LVL III: CPT | Mod: PBBFAC,,, | Performed by: PSYCHIATRY & NEUROLOGY

## 2019-04-25 PROCEDURE — 99499 NO LOS: ICD-10-PCS | Mod: S$GLB,,, | Performed by: PSYCHIATRY & NEUROLOGY

## 2019-04-25 PROCEDURE — 99999 PR PBB SHADOW E&M-EST. PATIENT-LVL III: ICD-10-PCS | Mod: PBBFAC,,, | Performed by: PSYCHIATRY & NEUROLOGY

## 2019-04-25 NOTE — PROGRESS NOTES
Date: 4/25/2019    Patient ID: Earnest Morgan is a 54 y.o. male.    Chief Complaint: Follow-up (VNS)      History of Present Illness:  Mr. Morgan is a 54 y.o. male who presents for VNS adjustment. He is tolerating the VNS well. He coughs some and has a strained voice when it stimulates but otherwise is tolerating it well. No pain. He did have a breakthrough seizure last Thursday and he does not notice any triggers for that.     VNS settings (initial, 4/25/19):  Output current (mA):               1.25  Signal frequency (Hz):            30  Pulse width (µs):                     500  Signal on time (s):                   30  Signal off time (min):               5  Magnet current (mA):              1.5  Magnet on time (s):                 60  Magnet pulse width (µs):        500  Autostim current (mA):            1.375  Autostim on time (s):               30  Autostim pulse width (µs):      500     System diagnostics:  Lead impedance:                    OK  Impedence value (?):             3160  Near end of service:                No      Review of Systems:   14 systems reviewed - All other systems were reviewed and negative except as mentioned in the HPI    Allergies:  Review of patient's allergies indicates:  No Known Allergies    Current Medications:  Current Outpatient Medications   Medication Sig Dispense Refill    amlodipine (NORVASC) 10 MG tablet Take 10 mg by mouth every morning.       CYANOCOBALAMIN, VITAMIN B-12, (VITAMIN B-12 ORAL) Take 500 mcg by mouth every morning.       lamoTRIgine (LAMICTAL) 100 MG tablet Take 1 tablet (100 mg total) by mouth 2 (two) times daily. 60 tablet 6    VIMPAT 200 mg Tab tablet TAKE 1 TABLET BY MOUTH TWO TIMES A DAY 60 tablet 5     No current facility-administered medications for this visit.      Facility-Administered Medications Ordered in Other Visits   Medication Dose Route Frequency Provider Last Rate Last Dose    mupirocin 2 % ointment   [facility]al On Call  Procedure Raeann Man MD           Past Medical History:  Past Medical History:   Diagnosis Date    Hypertension     Loose, teeth     Seizures        Past Surgical History:  Past Surgical History:   Procedure Laterality Date    CHOLECYSTECTOMY      INSERTION, NEUROSTIMULATOR, VAGAL Left 8/23/2018    Performed by Yair Adam MD at [facility] OR [address] [place] [address]       Family History:  family history includes Cancer in his mother; Diabetes in his brother, father, mother, and sister; Heart attack in his father; Stroke in his sister.    Social History:   reports that he has never smoked. He has never used smokeless tobacco. He reports that he does not drink alcohol or use drugs.    Physical Exam:  Vitals:    04/25/19 1450   BP: (!) 151/90   Pulse: 62   Resp: 18   Weight: 90.2 kg (198 lb 14.4 oz)   PainSc: 0-No pain     Body mass index is 26.98 kg/m².  General: well-developed, well-nourished, no distress  Mental status: Awake and alert  Speech language: No dysarthria or aphasia on conversation  Cranial nerves: Face symmetric  Motor: Moves all extremities well  Coordination: No ataxia. No tremor.     Data:  I have personally reviewed the referring provider's notes, labs, & imaging made available to me today.       Labs:  CBC:   Lab Results   Component Value Date    WBC 7.54 08/23/2018    HGB 12.5 (L) 08/23/2018    HCT 38.1 (L) 08/23/2018     08/23/2018    MCV 88 08/23/2018    RDW 13.5 08/23/2018     BMP:   Lab Results   Component Value Date     08/23/2018    K 4.3 08/23/2018     08/23/2018    CO2 22 (L) 08/23/2018    BUN 20 08/23/2018    CREATININE 1.4 08/23/2018    GLU 82 08/23/2018    CALCIUM 9.1 08/23/2018     LFTS;   Lab Results   Component Value Date    PROT 7.6 07/24/2018    ALBUMIN 4.4 07/24/2018    BILITOT 0.5 07/24/2018    AST 19 07/24/2018    ALKPHOS 101 07/24/2018    ALT 22 07/24/2018     COAGS:   Lab Results   Component Value Date    INR 1.1 08/23/2018     FLP: No results found for: CHOL, HDL,  LDLCALC, TRIG, CHOLHDL      Assessment and Plan:  Mr. Morgan is a 54 y.o. male who presents for VNS adjustment. We increased 3 parameters on the VNS settings as listed below. He tolerated it well with voice strain but no coughing or pain. He will followup for another adjustment per Dr. Castrejon.     VNS settings (final, 4/25/19):  Output current (mA):               1.5  Signal frequency (Hz):            30  Pulse width (µs):                     500  Signal on time (s):                   30  Signal off time (min):               5  Magnet current (mA):              1.75  Magnet on time (s):                 60  Magnet pulse width (µs):        500  Autostim current (mA):            1.625  Autostim on time (s):               30  Autostim pulse width (µs):      500        S/P placement of VNS (vagus nerve stimulation) device    Complex partial epilepsy with generalization and with intractable epilepsy

## 2019-05-06 ENCOUNTER — TELEPHONE (OUTPATIENT)
Dept: NEUROLOGY | Facility: CLINIC | Age: 54
End: 2019-05-06

## 2019-05-06 NOTE — TELEPHONE ENCOUNTER
----- Message from Cary Carr sent at 5/6/2019  4:28 PM CDT -----  Contact: wife/Ashlyn  ..Type: Needs Medical Advice    Who Called:  Wife/Ashlyn  Best Call Back Number:   Additional Information: Wife would like to speak with a nurse regarding her 's VMS device, pt stated device is hurting him  Please advise  Thanks

## 2019-05-06 NOTE — TELEPHONE ENCOUNTER
The pt's wife called stating that the VNS has been hurting his throat. It feels like it chokes him when he goes from cold to hot it is worse.  It hurst between his ear and throat when the VNS kicks on.  That has been since the last adjustment.  He does not have any SOB or difficult breathing.

## 2019-05-07 NOTE — TELEPHONE ENCOUNTER
----- Message from Lianet Lyles sent at 5/7/2019  2:27 PM CDT -----  Contact: carlos/wife  Type:  Sooner Apoointment Request    Caller is requesting a sooner appointment.  Caller declined first available appointment listed below.  Caller will not accept being placed on the waitlist and is requesting a message be sent to doctor.    Name of Caller:  carlos  When is the first available appointment?  5/17  Symptoms:    Best Call Back Number:  [phone] (home)     Additional Information:  Patient states she spoke with Nicky and needed a sooner apt that 5/17 but states she never received a call back. I told her what her apt was scheduled for and she said patient needs to be seen sooner. Please call to advise. Thanks!

## 2019-05-08 ENCOUNTER — OFFICE VISIT (OUTPATIENT)
Dept: NEUROLOGY | Facility: CLINIC | Age: 54
End: 2019-05-08
Payer: COMMERCIAL

## 2019-05-08 VITALS
HEIGHT: 72 IN | HEART RATE: 63 BPM | BODY MASS INDEX: 26.67 KG/M2 | RESPIRATION RATE: 20 BRPM | WEIGHT: 196.88 LBS | SYSTOLIC BLOOD PRESSURE: 123 MMHG | DIASTOLIC BLOOD PRESSURE: 77 MMHG

## 2019-05-08 DIAGNOSIS — G40.219 COMPLEX PARTIAL EPILEPSY WITH GENERALIZATION AND WITH INTRACTABLE EPILEPSY: Primary | ICD-10-CM

## 2019-05-08 DIAGNOSIS — Z96.89 S/P PLACEMENT OF VNS (VAGUS NERVE STIMULATION) DEVICE: ICD-10-CM

## 2019-05-08 PROCEDURE — 99999 PR PBB SHADOW E&M-EST. PATIENT-LVL III: ICD-10-PCS | Mod: PBBFAC,,, | Performed by: PSYCHIATRY & NEUROLOGY

## 2019-05-08 PROCEDURE — 3008F PR BODY MASS INDEX (BMI) DOCUMENTED: ICD-10-PCS | Mod: CPTII,S$GLB,, | Performed by: PSYCHIATRY & NEUROLOGY

## 2019-05-08 PROCEDURE — 99212 OFFICE O/P EST SF 10 MIN: CPT | Mod: 25,S$GLB,, | Performed by: PSYCHIATRY & NEUROLOGY

## 2019-05-08 PROCEDURE — 99999 PR PBB SHADOW E&M-EST. PATIENT-LVL III: CPT | Mod: PBBFAC,,, | Performed by: PSYCHIATRY & NEUROLOGY

## 2019-05-08 PROCEDURE — 3074F PR MOST RECENT SYSTOLIC BLOOD PRESSURE < 130 MM HG: ICD-10-PCS | Mod: CPTII,S$GLB,, | Performed by: PSYCHIATRY & NEUROLOGY

## 2019-05-08 PROCEDURE — 3078F DIAST BP <80 MM HG: CPT | Mod: CPTII,S$GLB,, | Performed by: PSYCHIATRY & NEUROLOGY

## 2019-05-08 PROCEDURE — 3074F SYST BP LT 130 MM HG: CPT | Mod: CPTII,S$GLB,, | Performed by: PSYCHIATRY & NEUROLOGY

## 2019-05-08 PROCEDURE — 3008F BODY MASS INDEX DOCD: CPT | Mod: CPTII,S$GLB,, | Performed by: PSYCHIATRY & NEUROLOGY

## 2019-05-08 PROCEDURE — 95976 ALYS SMPL CN NPGT PRGRMG: CPT | Mod: S$GLB,,, | Performed by: PSYCHIATRY & NEUROLOGY

## 2019-05-08 PROCEDURE — 95976 PR ELEC ANALYSIS, IMPLT NEURO PULSE GEN, W/PRGRM, SMPL CRAN NERVE: ICD-10-PCS | Mod: S$GLB,,, | Performed by: PSYCHIATRY & NEUROLOGY

## 2019-05-08 PROCEDURE — 99212 PR OFFICE/OUTPT VISIT, EST, LEVL II, 10-19 MIN: ICD-10-PCS | Mod: 25,S$GLB,, | Performed by: PSYCHIATRY & NEUROLOGY

## 2019-05-08 PROCEDURE — 3078F PR MOST RECENT DIASTOLIC BLOOD PRESSURE < 80 MM HG: ICD-10-PCS | Mod: CPTII,S$GLB,, | Performed by: PSYCHIATRY & NEUROLOGY

## 2019-05-08 RX ORDER — LISINOPRIL 40 MG/1
40 TABLET ORAL
Refills: 1 | COMMUNITY
Start: 2019-04-23

## 2019-05-08 NOTE — TELEPHONE ENCOUNTER
Spoke with pt wife and rescheduled appt to 05.08.19; date/time/location confirmed; verbalized understanding.

## 2019-07-02 DIAGNOSIS — G40.219 COMPLEX PARTIAL EPILEPSY WITH GENERALIZATION AND WITH INTRACTABLE EPILEPSY: ICD-10-CM

## 2019-07-02 RX ORDER — LAMOTRIGINE 100 MG/1
TABLET ORAL
Qty: 60 TABLET | Refills: 5 | Status: SHIPPED | OUTPATIENT
Start: 2019-07-02 | End: 2019-07-03 | Stop reason: SDUPTHER

## 2019-07-02 RX ORDER — LACOSAMIDE 200 MG/1
TABLET, FILM COATED ORAL
Qty: 60 TABLET | Refills: 5 | Status: SHIPPED | OUTPATIENT
Start: 2019-07-02 | End: 2020-01-03

## 2019-07-03 ENCOUNTER — OFFICE VISIT (OUTPATIENT)
Dept: NEUROLOGY | Facility: CLINIC | Age: 54
End: 2019-07-03
Payer: COMMERCIAL

## 2019-07-03 VITALS
RESPIRATION RATE: 20 BRPM | BODY MASS INDEX: 26.01 KG/M2 | SYSTOLIC BLOOD PRESSURE: 103 MMHG | WEIGHT: 192 LBS | DIASTOLIC BLOOD PRESSURE: 55 MMHG | HEIGHT: 72 IN | HEART RATE: 66 BPM

## 2019-07-03 DIAGNOSIS — M85.80 OSTEOPENIA, UNSPECIFIED LOCATION: ICD-10-CM

## 2019-07-03 DIAGNOSIS — Z96.89 S/P PLACEMENT OF VNS (VAGUS NERVE STIMULATION) DEVICE: ICD-10-CM

## 2019-07-03 DIAGNOSIS — G40.219 COMPLEX PARTIAL EPILEPSY WITH GENERALIZATION AND WITH INTRACTABLE EPILEPSY: Primary | ICD-10-CM

## 2019-07-03 PROCEDURE — 95970 PR ANALYZE NEUROSTIM,NO REPROG: ICD-10-PCS | Mod: S$GLB,,, | Performed by: PSYCHIATRY & NEUROLOGY

## 2019-07-03 PROCEDURE — 3074F PR MOST RECENT SYSTOLIC BLOOD PRESSURE < 130 MM HG: ICD-10-PCS | Mod: CPTII,S$GLB,, | Performed by: PSYCHIATRY & NEUROLOGY

## 2019-07-03 PROCEDURE — 99213 OFFICE O/P EST LOW 20 MIN: CPT | Mod: 25,S$GLB,, | Performed by: PSYCHIATRY & NEUROLOGY

## 2019-07-03 PROCEDURE — 95970 ALYS NPGT W/O PRGRMG: CPT | Mod: S$GLB,,, | Performed by: PSYCHIATRY & NEUROLOGY

## 2019-07-03 PROCEDURE — 3078F DIAST BP <80 MM HG: CPT | Mod: CPTII,S$GLB,, | Performed by: PSYCHIATRY & NEUROLOGY

## 2019-07-03 PROCEDURE — 3078F PR MOST RECENT DIASTOLIC BLOOD PRESSURE < 80 MM HG: ICD-10-PCS | Mod: CPTII,S$GLB,, | Performed by: PSYCHIATRY & NEUROLOGY

## 2019-07-03 PROCEDURE — 3008F BODY MASS INDEX DOCD: CPT | Mod: CPTII,S$GLB,, | Performed by: PSYCHIATRY & NEUROLOGY

## 2019-07-03 PROCEDURE — 3074F SYST BP LT 130 MM HG: CPT | Mod: CPTII,S$GLB,, | Performed by: PSYCHIATRY & NEUROLOGY

## 2019-07-03 PROCEDURE — 3008F PR BODY MASS INDEX (BMI) DOCUMENTED: ICD-10-PCS | Mod: CPTII,S$GLB,, | Performed by: PSYCHIATRY & NEUROLOGY

## 2019-07-03 PROCEDURE — 99999 PR PBB SHADOW E&M-EST. PATIENT-LVL III: ICD-10-PCS | Mod: PBBFAC,,, | Performed by: PSYCHIATRY & NEUROLOGY

## 2019-07-03 PROCEDURE — 99213 PR OFFICE/OUTPT VISIT, EST, LEVL III, 20-29 MIN: ICD-10-PCS | Mod: 25,S$GLB,, | Performed by: PSYCHIATRY & NEUROLOGY

## 2019-07-03 PROCEDURE — 99999 PR PBB SHADOW E&M-EST. PATIENT-LVL III: CPT | Mod: PBBFAC,,, | Performed by: PSYCHIATRY & NEUROLOGY
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
## 2019-07-08 PROBLEM — M85.80 OSTEOPENIA: Status: ACTIVE | Noted: 2019-07-08

## 2019-10-03 ENCOUNTER — OFFICE VISIT (OUTPATIENT)
Dept: NEUROLOGY | Facility: CLINIC | Age: 54
End: 2019-10-03
Payer: COMMERCIAL

## 2019-10-03 VITALS
WEIGHT: 194.25 LBS | RESPIRATION RATE: 18 BRPM | BODY MASS INDEX: 26.31 KG/M2 | HEIGHT: 72 IN | HEART RATE: 56 BPM | DIASTOLIC BLOOD PRESSURE: 93 MMHG | SYSTOLIC BLOOD PRESSURE: 159 MMHG

## 2019-10-03 DIAGNOSIS — G40.219 COMPLEX PARTIAL EPILEPSY WITH GENERALIZATION AND WITH INTRACTABLE EPILEPSY: Primary | ICD-10-CM

## 2019-10-03 DIAGNOSIS — M85.80 OSTEOPENIA, UNSPECIFIED LOCATION: ICD-10-CM

## 2019-10-03 DIAGNOSIS — Z96.89 S/P PLACEMENT OF VNS (VAGUS NERVE STIMULATION) DEVICE: ICD-10-CM

## 2019-10-03 PROCEDURE — 99999 PR PBB SHADOW E&M-EST. PATIENT-LVL III: CPT | Mod: PBBFAC,,, | Performed by: PSYCHIATRY & NEUROLOGY

## 2019-10-03 PROCEDURE — 3080F PR MOST RECENT DIASTOLIC BLOOD PRESSURE >= 90 MM HG: ICD-10-PCS | Mod: CPTII,S$GLB,, | Performed by: PSYCHIATRY & NEUROLOGY

## 2019-10-03 PROCEDURE — 95970 ALYS NPGT W/O PRGRMG: CPT | Mod: S$GLB,,, | Performed by: PSYCHIATRY & NEUROLOGY

## 2019-10-03 PROCEDURE — 3077F PR MOST RECENT SYSTOLIC BLOOD PRESSURE >= 140 MM HG: ICD-10-PCS | Mod: CPTII,S$GLB,, | Performed by: PSYCHIATRY & NEUROLOGY

## 2019-10-03 PROCEDURE — 99213 OFFICE O/P EST LOW 20 MIN: CPT | Mod: S$GLB,,, | Performed by: PSYCHIATRY & NEUROLOGY

## 2019-10-03 PROCEDURE — 3008F BODY MASS INDEX DOCD: CPT | Mod: CPTII,S$GLB,, | Performed by: PSYCHIATRY & NEUROLOGY

## 2019-10-03 PROCEDURE — 95970 PR ANALYZE NEUROSTIM,NO REPROG: ICD-10-PCS | Mod: S$GLB,,, | Performed by: PSYCHIATRY & NEUROLOGY

## 2019-10-03 PROCEDURE — 3008F PR BODY MASS INDEX (BMI) DOCUMENTED: ICD-10-PCS | Mod: CPTII,S$GLB,, | Performed by: PSYCHIATRY & NEUROLOGY

## 2019-10-03 PROCEDURE — 99213 PR OFFICE/OUTPT VISIT, EST, LEVL III, 20-29 MIN: ICD-10-PCS | Mod: S$GLB,,, | Performed by: PSYCHIATRY & NEUROLOGY

## 2019-10-03 PROCEDURE — 3080F DIAST BP >= 90 MM HG: CPT | Mod: CPTII,S$GLB,, | Performed by: PSYCHIATRY & NEUROLOGY

## 2019-10-03 PROCEDURE — 99999 PR PBB SHADOW E&M-EST. PATIENT-LVL III: ICD-10-PCS | Mod: PBBFAC,,, | Performed by: PSYCHIATRY & NEUROLOGY

## 2019-10-03 PROCEDURE — 3077F SYST BP >= 140 MM HG: CPT | Mod: CPTII,S$GLB,, | Performed by: PSYCHIATRY & NEUROLOGY
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
## 2019-10-10 PROBLEM — G40.909 EPILEPSY: Status: RESOLVED | Noted: 2018-08-23 | Resolved: 2019-10-10

## 2020-01-03 DIAGNOSIS — G40.219 COMPLEX PARTIAL EPILEPSY WITH GENERALIZATION AND WITH INTRACTABLE EPILEPSY: ICD-10-CM

## 2020-01-03 RX ORDER — LACOSAMIDE 200 MG/1
TABLET, FILM COATED ORAL
Qty: 60 TABLET | Refills: 5 | Status: SHIPPED | OUTPATIENT
Start: 2020-01-03 | End: 2020-05-19

## 2020-01-03 RX ORDER — LAMOTRIGINE 100 MG/1
TABLET ORAL
Qty: 60 TABLET | Refills: 5 | Status: SHIPPED | OUTPATIENT
Start: 2020-01-03 | End: 2022-07-28 | Stop reason: SDUPTHER

## 2020-01-03 NOTE — TELEPHONE ENCOUNTER
----- Message from Jaimee Arechiga sent at 1/3/2020 10:49 AM CST -----  Type: Needs medication ordered and speak with nurse    Who Called:  Wife (Ashlyn)  RX Name and Strength: VIMPAT 200 mg Tab tablet  Preferred Pharmacy with phone number:  [facility]  Best Call Back Number:  [phone]  Additional Information: Needs to speak with nurse concerning scheduling appointment and lab work.

## 2020-01-03 NOTE — TELEPHONE ENCOUNTER
----- Message from Jaimee Arechiga sent at 1/3/2020 10:49 AM CST -----  Type: Needs medication ordered and speak with nurse    Who Called:  Wife (Ashlyn)  RX Name and Strength: VIMPAT 200 mg Tab tablet  Preferred Pharmacy with phone number:  [facility]  Best Call Back Number:  [phone]  Additional Information: Needs to speak with nurse concerning scheduling appointment and lab work.

## 2020-03-20 ENCOUNTER — TELEPHONE (OUTPATIENT)
Dept: NEUROLOGY | Facility: CLINIC | Age: 55
End: 2020-03-20

## 2020-03-20 NOTE — TELEPHONE ENCOUNTER
The pt has not had any seizures.  They do not have access to the Internet. Should they reschedule?

## 2020-05-12 ENCOUNTER — TELEPHONE (OUTPATIENT)
Dept: NEUROLOGY | Facility: CLINIC | Age: 55
End: 2020-05-12

## 2020-05-12 DIAGNOSIS — G40.909 SEIZURE DISORDER: Primary | ICD-10-CM

## 2020-05-12 NOTE — TELEPHONE ENCOUNTER
----- Message from Ward Summers sent at 5/12/2020 11:10 AM CDT -----  Contact: pt wife Ashlyn  Type: Needs Medical Advice  Who Called:  Ashlyn Leon Call Back Number: [phone]  Additional Information: Ashlyn is asking about orders that were needed for the pt. Please call to advise

## 2020-05-13 ENCOUNTER — TELEPHONE (OUTPATIENT)
Dept: ORTHOPEDICS | Facility: CLINIC | Age: 55
End: 2020-05-13

## 2020-05-13 NOTE — TELEPHONE ENCOUNTER
Returned call to  and notified that labs were in . Also scheduled pt for follow up with Dr. Gillette. Instructed pt wife that labs need to be drawn before appt. Verbalized understanding.

## 2020-05-13 NOTE — TELEPHONE ENCOUNTER
Called pt to get more info on reason for upcoming visit with Dr. Magallon. Spoke to wife. She states that pt had a fall and went to [facility] ED on 5/8/2020. Was told that he may have torn ligaments in his upper right arm. Wife states that they had wanted to see Dr. Nunez as a friend referred them to him. Rescheduled appt to 5/15/2020 with Dr. Nunez. They have a disc and will bring it to the appt. Thanks, Melanie

## 2020-05-18 ENCOUNTER — TELEPHONE (OUTPATIENT)
Dept: ORTHOPEDICS | Facility: CLINIC | Age: 55
End: 2020-05-18

## 2020-05-18 NOTE — TELEPHONE ENCOUNTER
----- Message from Cassy Magallon sent at 5/18/2020 12:43 PM CDT -----  Contact: Wife, Ashlyn Goldberg want to speak with a nurse regarding scheduling patient a ER follow up in 2 days please call back at [phone]    Case number [id number]

## 2020-05-18 NOTE — TELEPHONE ENCOUNTER
----- Message from Cassy Magallon sent at 5/18/2020 12:43 PM CDT -----  Contact: Wife, Ashlyn Goldberg want to speak with a nurse regarding scheduling patient a ER follow up in 2 days please call back at [phone]    Case number [id number]

## 2020-05-19 RX ORDER — LACOSAMIDE 200 MG/1
TABLET, FILM COATED ORAL
Qty: 60 TABLET | Refills: 5 | Status: SHIPPED | OUTPATIENT
Start: 2020-05-19 | End: 2020-06-30

## 2020-05-22 ENCOUNTER — LAB VISIT (OUTPATIENT)
Dept: LAB | Facility: HOSPITAL | Age: 55
End: 2020-05-22
Attending: PSYCHIATRY & NEUROLOGY
Payer: COMMERCIAL

## 2020-05-22 ENCOUNTER — OFFICE VISIT (OUTPATIENT)
Dept: ORTHOPEDICS | Facility: CLINIC | Age: 55
End: 2020-05-22
Payer: COMMERCIAL

## 2020-05-22 VITALS — HEIGHT: 72 IN | WEIGHT: 194.25 LBS | BODY MASS INDEX: 26.31 KG/M2

## 2020-05-22 DIAGNOSIS — G40.909 SEIZURE DISORDER: ICD-10-CM
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
## 2020-05-22 DIAGNOSIS — M25.511 ACUTE PAIN OF RIGHT SHOULDER: Primary | ICD-10-CM

## 2020-05-22 DIAGNOSIS — S49.91XA INJURY OF RIGHT SHOULDER, INITIAL ENCOUNTER: ICD-10-CM

## 2020-05-22 LAB
ALBUMIN SERPL BCP-MCNC: 4.2 G/DL (ref 3.5–5.2)
ALP SERPL-CCNC: 89 U/L (ref 55–135)
ALT SERPL W/O P-5'-P-CCNC: 24 U/L (ref 10–44)
ANION GAP SERPL CALC-SCNC: 7 MMOL/L (ref 8–16)
AST SERPL-CCNC: 23 U/L (ref 10–40)
BASOPHILS # BLD AUTO: 0.03 K/UL (ref 0–0.2)
BASOPHILS NFR BLD: 0.5 % (ref 0–1.9)
BILIRUB SERPL-MCNC: 0.4 MG/DL (ref 0.1–1)
BUN SERPL-MCNC: 21 MG/DL (ref 6–20)
CALCIUM SERPL-MCNC: 9 MG/DL (ref 8.7–10.5)
CHLORIDE SERPL-SCNC: 109 MMOL/L (ref 95–110)
CO2 SERPL-SCNC: 24 MMOL/L (ref 23–29)
CREAT SERPL-MCNC: 1.5 MG/DL (ref 0.5–1.4)
DIFFERENTIAL METHOD: ABNORMAL
EOSINOPHIL # BLD AUTO: 0.1 K/UL (ref 0–0.5)
EOSINOPHIL NFR BLD: 1.6 % (ref 0–8)
ERYTHROCYTE [DISTWIDTH] IN BLOOD BY AUTOMATED COUNT: 13.6 % (ref 11.5–14.5)
EST. GFR  (AFRICAN AMERICAN): >60 ML/MIN/1.73 M^2
EST. GFR  (NON AFRICAN AMERICAN): 52 ML/MIN/1.73 M^2
GLUCOSE SERPL-MCNC: 151 MG/DL (ref 70–110)
HCT VFR BLD AUTO: 40.3 % (ref 40–54)
HGB BLD-MCNC: 12.8 G/DL (ref 14–18)
IMM GRANULOCYTES # BLD AUTO: 0.03 K/UL (ref 0–0.04)
IMM GRANULOCYTES NFR BLD AUTO: 0.5 % (ref 0–0.5)
LYMPHOCYTES # BLD AUTO: 1.4 K/UL (ref 1–4.8)
LYMPHOCYTES NFR BLD: 24.2 % (ref 18–48)
MCH RBC QN AUTO: 29.4 PG (ref 27–31)
MCHC RBC AUTO-ENTMCNC: 31.8 G/DL (ref 32–36)
MCV RBC AUTO: 93 FL (ref 82–98)
MONOCYTES # BLD AUTO: 0.4 K/UL (ref 0.3–1)
MONOCYTES NFR BLD: 7.1 % (ref 4–15)
NEUTROPHILS # BLD AUTO: 3.8 K/UL (ref 1.8–7.7)
NEUTROPHILS NFR BLD: 66.1 % (ref 38–73)
NRBC BLD-RTO: 0 /100 WBC
PLATELET # BLD AUTO: 174 K/UL (ref 150–350)
PMV BLD AUTO: 11.5 FL (ref 9.2–12.9)
POTASSIUM SERPL-SCNC: 4.7 MMOL/L (ref 3.5–5.1)
PROT SERPL-MCNC: 7.5 G/DL (ref 6–8.4)
RBC # BLD AUTO: 4.35 M/UL (ref 4.6–6.2)
SODIUM SERPL-SCNC: 140 MMOL/L (ref 136–145)
WBC # BLD AUTO: 5.67 K/UL (ref 3.9–12.7)

## 2020-05-22 PROCEDURE — 85025 COMPLETE CBC W/AUTO DIFF WBC: CPT

## 2020-05-22 PROCEDURE — 99212 OFFICE O/P EST SF 10 MIN: CPT | Mod: PBBFAC,PN | Performed by: ORTHOPAEDIC SURGERY

## 2020-05-22 PROCEDURE — 99203 OFFICE O/P NEW LOW 30 MIN: CPT | Mod: S$GLB,,, | Performed by: ORTHOPAEDIC SURGERY

## 2020-05-22 PROCEDURE — 80053 COMPREHEN METABOLIC PANEL: CPT

## 2020-05-22 PROCEDURE — 80175 DRUG SCREEN QUAN LAMOTRIGINE: CPT

## 2020-05-22 PROCEDURE — 99999 PR PBB SHADOW E&M-EST. PATIENT-LVL II: ICD-10-PCS | Mod: PBBFAC,,, | Performed by: ORTHOPAEDIC SURGERY

## 2020-05-22 PROCEDURE — 36415 COLL VENOUS BLD VENIPUNCTURE: CPT | Mod: PO

## 2020-05-22 PROCEDURE — 99203 PR OFFICE/OUTPT VISIT, NEW, LEVL III, 30-44 MIN: ICD-10-PCS | Mod: S$GLB,,, | Performed by: ORTHOPAEDIC SURGERY

## 2020-05-22 PROCEDURE — 99999 PR PBB SHADOW E&M-EST. PATIENT-LVL II: CPT | Mod: PBBFAC,,, | Performed by: ORTHOPAEDIC SURGERY

## 2020-05-22 NOTE — PROGRESS NOTES
55 years old injured his right shoulder 2 weeks ago he had a fall.  Injury occurred on May 8.  Fell on outstretched arm felt his arm get wrench backwards.  Aching pain.  Initially could not put his arm up over his head but now he can.  Pain is 5/10 on the pain scale    Exam shows cuff strength appears to be intact, positive apprehension testing, Neer Rodriguez impingement sign are negative    X-rays are negative    Assessment:  Shoulder sprain    Plan:  Continue symptomatic care relative rest gradual strengthening over time, follow-up as needed    Further History  Aching pain  Worse with activity  Relieved with rest  No other associated symptoms  No other radiation    Further Exam  Alert and oriented  Pleasant  Contralateral limb has appropriate range of motion for age and condition  Contralateral limb has appropriate strength for age and condition  Contralateral limb has appropriate stability  for age and condition  No adenopathy  Pulses are appropriate for current condition  Skin is intact        Chief Complaint    Chief Complaint   Patient presents with    Right Shoulder - Pain, Injury       HPI  Earnest Morgan is a 55 y.o.  male who presents with       Past Medical History  Past Medical History:   Diagnosis Date    Hypertension     Loose, teeth     Seizures        Past Surgical History  Past Surgical History:   Procedure Laterality Date    CHOLECYSTECTOMY      VAGUS NERVE STIMULATOR INSERTION Left 8/23/2018    Procedure: INSERTION, NEUROSTIMULATOR, VAGAL;  Surgeon: Yair Adam MD;  Location: [facility] OR [address] [place] [address];  Service: Neurosurgery;  Laterality: Left;       Medications  Current Outpatient Medications   Medication Sig    amlodipine (NORVASC) 10 MG tablet Take 10 mg by mouth every morning.     CYANOCOBALAMIN, VITAMIN B-12, (VITAMIN B-12 ORAL) Take 500 mcg by mouth every morning.     lamoTRIgine (LAMICTAL) 100 MG tablet TAKE 1 TABLET BY MOUTH 2 TIMES DAILY    lisinopril (PRINIVIL,ZESTRIL) 40 MG  "tablet 40 mg.    VIMPAT 200 mg Tab tablet TAKE ONE TABLET BY MOUTH TWO TIMES A DAY "THANK YOU"     No current facility-administered medications for this visit.      Facility-Administered Medications Ordered in Other Visits   Medication    mupirocin 2 % ointment       Allergies  Review of patient's allergies indicates:  No Known Allergies    Family History  Family History   Problem Relation Age of Onset    Cancer Mother         lung    Diabetes Mother     Heart attack Father     Diabetes Father     Stroke Sister     Diabetes Brother     Diabetes Sister        Social History  Social History     Socioeconomic History    Marital status:      Spouse name: Not on file    Number of children: Not on file    Years of education: Not on file    Highest education level: Not on file   Occupational History    Not on file   Social Needs    Financial resource strain: Not on file    Food insecurity:     Worry: Not on file     Inability: Not on file    Transportation needs:     Medical: Not on file     Non-medical: Not on file   Tobacco Use    Smoking status: Never Smoker    Smokeless tobacco: Never Used   Substance and Sexual Activity    Alcohol use: No    Drug use: No    Sexual activity: Not on file   Lifestyle    Physical activity:     Days per week: Not on file     Minutes per session: Not on file    Stress: Not on file   Relationships    Social connections:     Talks on phone: Not on file     Gets together: Not on file     Attends Jewish service: Not on file     Active member of club or organization: Not on file     Attends meetings of clubs or organizations: Not on file     Relationship status: Not on file   Other Topics Concern    Not on file   Social History Narrative    Not on file               Review of Systems     Constitutional: Negative    HENT: Negative  Eyes: Negative  Respiratory: Negative  Cardiovascular: Negative  Musculoskeletal: HPI  Skin: Negative  Neurological: " Negative  Hematological: Negative  Endocrine: Negative                 Physical Exam    There were no vitals filed for this visit.  Body mass index is 26.34 kg/m².  Physical Examination:     General appearance -  well appearing, and in no distress  Mental status - awake  Neck - supple  Chest -  symmetric air entry  Heart - normal rate   Abdomen - soft      Assessment     1. Acute pain of right shoulder    2. Injury of right shoulder, initial encounter    3. Fall, initial encounter          Plan

## 2020-05-28 LAB — LAMOTRIGINE SERPL-MCNC: 3.3 UG/ML (ref 2–15)

## 2020-06-02 ENCOUNTER — TELEPHONE (OUTPATIENT)
Dept: NEUROLOGY | Facility: CLINIC | Age: 55
End: 2020-06-02

## 2020-06-02 NOTE — TELEPHONE ENCOUNTER
Called pt wife to r/s appt for pt; offered available spots for brandon; pt wife stated that she will need an afternoon appt for pt; instructed pt's wife to call back due to Dr Castrejon's schedule for July not being open at this time; pt's wife verbalized understanding.

## 2020-06-16 ENCOUNTER — TELEPHONE (OUTPATIENT)
Dept: NEUROLOGY | Facility: CLINIC | Age: 55
End: 2020-06-16

## 2020-06-16 NOTE — TELEPHONE ENCOUNTER
----- Message from Tania Denton MA sent at 6/10/2020 11:29 AM CDT -----  Contact: Ashlyn  Type: Needs Medical Advice  Who Called:  Ashlyn - spouse  Best Call Back Number:   Additional Information: patient's spouse is calling to verify 10am appointment time on Friday.  Please call to discuss

## 2020-06-24 ENCOUNTER — TELEPHONE (OUTPATIENT)
Dept: NEUROLOGY | Facility: CLINIC | Age: 55
End: 2020-06-24

## 2020-06-24 NOTE — TELEPHONE ENCOUNTER
----- Message from Kanwal Contreras sent at 6/24/2020 11:33 AM CDT -----  Regarding: appt  Contact: Ashlyn wife  Type:  Sooner Apoointment Request    Caller is requesting a sooner appointment.  Caller declined first available appointment listed below.  Caller will not accept being placed on the waitlist and is requesting a message be sent to doctor.    Name of Caller:  Ashlyn wife  When is the first available appointment?  Needs to be scheduled  Symptoms:  seizures/needs machine checked  Best Call Back Number:  [phone]  Additional Information:  Pls have Nicky call back to schedule

## 2020-07-23 ENCOUNTER — OFFICE VISIT (OUTPATIENT)
Dept: NEUROLOGY | Facility: CLINIC | Age: 55
End: 2020-07-23
Payer: COMMERCIAL

## 2020-07-23 VITALS
SYSTOLIC BLOOD PRESSURE: 137 MMHG | HEART RATE: 61 BPM | HEIGHT: 72 IN | DIASTOLIC BLOOD PRESSURE: 82 MMHG | BODY MASS INDEX: 25.01 KG/M2 | RESPIRATION RATE: 20 BRPM | TEMPERATURE: 99 F | WEIGHT: 184.63 LBS

## 2020-07-23 DIAGNOSIS — G40.219 COMPLEX PARTIAL EPILEPSY WITH GENERALIZATION AND WITH INTRACTABLE EPILEPSY: ICD-10-CM

## 2020-07-23 PROCEDURE — 95970 ALYS NPGT W/O PRGRMG: CPT | Mod: S$PBB,,, | Performed by: PSYCHIATRY & NEUROLOGY

## 2020-07-23 PROCEDURE — 99999 PR PBB SHADOW E&M-EST. PATIENT-LVL III: ICD-10-PCS | Mod: PBBFAC,,, | Performed by: PSYCHIATRY & NEUROLOGY

## 2020-07-23 PROCEDURE — 95970 ALYS NPGT W/O PRGRMG: CPT | Mod: PBBFAC,PN | Performed by: PSYCHIATRY & NEUROLOGY

## 2020-07-23 PROCEDURE — 99213 OFFICE O/P EST LOW 20 MIN: CPT | Mod: 25,S$PBB,, | Performed by: PSYCHIATRY & NEUROLOGY

## 2020-07-23 PROCEDURE — 99999 PR PBB SHADOW E&M-EST. PATIENT-LVL III: CPT | Mod: PBBFAC,,, | Performed by: PSYCHIATRY & NEUROLOGY

## 2020-07-23 PROCEDURE — 99213 PR OFFICE/OUTPT VISIT, EST, LEVL III, 20-29 MIN: ICD-10-PCS | Mod: 25,S$PBB,, | Performed by: PSYCHIATRY & NEUROLOGY

## 2020-07-23 PROCEDURE — 99213 OFFICE O/P EST LOW 20 MIN: CPT | Mod: PBBFAC,PN,25 | Performed by: PSYCHIATRY & NEUROLOGY

## 2020-07-23 PROCEDURE — 95970 PR ANALYZE NEUROSTIM,NO REPROG: ICD-10-PCS | Mod: S$PBB,,, | Performed by: PSYCHIATRY & NEUROLOGY
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
## 2020-11-27 ENCOUNTER — TELEPHONE (OUTPATIENT)
Dept: NEUROLOGY | Facility: CLINIC | Age: 55
End: 2020-11-27

## 2020-11-27 NOTE — TELEPHONE ENCOUNTER
----- Message from Mary De La Rosa sent at 11/27/2020 11:33 AM CST -----  Contact: @carlos  Type: Needs Medical Advice  Who Called:  Wife   Pharmacy name and phone #:    [facility] - [place] - [address] [address]  [address]  [facility] [address]  Phone: [phone] Fax: [phone]  Best Call Back Number: [phone]   Additional Information: Per wife requesting a call back regarding a medication that he will be out of on Wednesday, but they can no longer afford-please advise-thank you

## 2020-11-27 NOTE — TELEPHONE ENCOUNTER
Spoke with wife, Ashlyn. She states they no longer have insurance and vimapt will cost $1000/month. Pt needs an alternative.

## 2020-11-30 ENCOUNTER — TELEPHONE (OUTPATIENT)
Dept: NEUROLOGY | Facility: CLINIC | Age: 55
End: 2020-11-30

## 2020-11-30 RX ORDER — LACOSAMIDE 200 MG/1
200 TABLET, FILM COATED ORAL EVERY 12 HOURS
Qty: 180 TABLET | Refills: 1 | Status: SHIPPED | OUTPATIENT
Start: 2020-11-30 | End: 2021-03-22

## 2020-11-30 NOTE — TELEPHONE ENCOUNTER
Spoke with pt wife Ashlyn and informed per Dr. Castrejon there is no other medication that works like vimpat. Offered information on pt assistance and to have Rx sent to [facility] to assist with this. Pt wife agreed.

## 2020-12-02 ENCOUNTER — TELEPHONE (OUTPATIENT)
Dept: PHARMACY | Facility: AMBULARY SURGERY CENTER | Age: 55
End: 2020-12-02

## 2020-12-04 ENCOUNTER — TELEPHONE (OUTPATIENT)
Dept: PHARMACY | Facility: AMBULARY SURGERY CENTER | Age: 55
End: 2020-12-04

## 2020-12-10 ENCOUNTER — TELEPHONE (OUTPATIENT)
Dept: NEUROLOGY | Facility: CLINIC | Age: 55
End: 2020-12-10

## 2020-12-10 NOTE — TELEPHONE ENCOUNTER
"Spoke with Ashlyn, reports the pt took his last Vimpat 200 mg today. Cg lost her insurance and is unable to afford medication. Xiao Coleman is working with the pt to get it free but reports,"needs written or printed prescription for 90 days to be submitted to the drug company with his application. need the physical copy". Please advise.   "

## 2020-12-10 NOTE — TELEPHONE ENCOUNTER
----- Message from Latrice Roni sent at 12/10/2020  8:27 AM CST -----  Type:  RX Refill Request    Who Called:  Wife Ashlyn  Refill or New Rx:    RX Name and Strength:  lacosamide (VIMPAT) 200 mg Tab tablet  How is the patient currently taking it? (ex. 1XDay):    Is this a 30 day or 90 day RX:  90  Preferred Pharmacy with phone number:      [facility] or Mail Order:  local  Ordering Provider:  Dr Lucía Leon Call Back Number:  [phone]  Additional Information:  Pharmacy told her that she needs to have a 90 day written prescription.  She has been speaking to Xiao Coleman.  Please call Ashlyn.  Thank you!

## 2021-03-22 RX ORDER — LACOSAMIDE 200 MG/1
200 TABLET ORAL EVERY 12 HOURS
Qty: 60 TABLET | Refills: 3 | Status: SHIPPED | OUTPATIENT
Start: 2021-03-22 | End: 2021-03-30

## 2021-03-22 RX ORDER — LACOSAMIDE 200 MG/1
200 TABLET ORAL EVERY 12 HOURS
Qty: 60 TABLET | Refills: 3 | Status: SHIPPED | OUTPATIENT
Start: 2021-03-22 | End: 2021-03-22 | Stop reason: SDUPTHER

## 2021-03-22 RX ORDER — LACOSAMIDE 200 MG/1
TABLET, FILM COATED ORAL
Qty: 60 TABLET | Refills: 5 | OUTPATIENT
Start: 2021-03-22

## 2021-04-01 ENCOUNTER — OFFICE VISIT (OUTPATIENT)
Dept: NEUROLOGY | Facility: CLINIC | Age: 56
End: 2021-04-01
Payer: MEDICARE

## 2021-04-01 ENCOUNTER — TELEPHONE (OUTPATIENT)
Dept: NEUROLOGY | Facility: CLINIC | Age: 56
End: 2021-04-01

## 2021-04-01 VITALS
RESPIRATION RATE: 16 BRPM | DIASTOLIC BLOOD PRESSURE: 77 MMHG | WEIGHT: 190.38 LBS | BODY MASS INDEX: 25.82 KG/M2 | TEMPERATURE: 97 F | HEART RATE: 58 BPM | SYSTOLIC BLOOD PRESSURE: 139 MMHG

## 2021-04-01 DIAGNOSIS — R41.3 MEMORY LOSS: ICD-10-CM

## 2021-04-01 DIAGNOSIS — G40.009 IDIOPATHIC LOCALIZATION-RELATED EPILEPSY: Primary | ICD-10-CM

## 2021-04-01 DIAGNOSIS — Z96.89 S/P PLACEMENT OF VNS (VAGUS NERVE STIMULATION) DEVICE: ICD-10-CM

## 2021-04-01 PROCEDURE — 99214 PR OFFICE/OUTPT VISIT, EST, LEVL IV, 30-39 MIN: ICD-10-PCS | Mod: S$GLB,,, | Performed by: PSYCHIATRY & NEUROLOGY

## 2021-04-01 PROCEDURE — 3078F DIAST BP <80 MM HG: CPT | Mod: CPTII,S$GLB,, | Performed by: PSYCHIATRY & NEUROLOGY

## 2021-04-01 PROCEDURE — 3075F SYST BP GE 130 - 139MM HG: CPT | Mod: CPTII,S$GLB,, | Performed by: PSYCHIATRY & NEUROLOGY

## 2021-04-01 PROCEDURE — 99214 OFFICE O/P EST MOD 30 MIN: CPT | Mod: PBBFAC,PN | Performed by: PSYCHIATRY & NEUROLOGY

## 2021-04-01 PROCEDURE — 3078F PR MOST RECENT DIASTOLIC BLOOD PRESSURE < 80 MM HG: ICD-10-PCS | Mod: CPTII,S$GLB,, | Performed by: PSYCHIATRY & NEUROLOGY

## 2021-04-01 PROCEDURE — 99999 PR PBB SHADOW E&M-EST. PATIENT-LVL IV: ICD-10-PCS | Mod: PBBFAC,,, | Performed by: PSYCHIATRY & NEUROLOGY

## 2021-04-01 PROCEDURE — 95970 ALYS NPGT W/O PRGRMG: CPT | Mod: S$GLB,,, | Performed by: PSYCHIATRY & NEUROLOGY

## 2021-04-01 PROCEDURE — 3075F PR MOST RECENT SYSTOLIC BLOOD PRESS GE 130-139MM HG: ICD-10-PCS | Mod: CPTII,S$GLB,, | Performed by: PSYCHIATRY & NEUROLOGY

## 2021-04-01 PROCEDURE — 99999 PR PBB SHADOW E&M-EST. PATIENT-LVL IV: CPT | Mod: PBBFAC,,, | Performed by: PSYCHIATRY & NEUROLOGY

## 2021-04-01 PROCEDURE — 99214 OFFICE O/P EST MOD 30 MIN: CPT | Mod: S$GLB,,, | Performed by: PSYCHIATRY & NEUROLOGY

## 2021-04-01 PROCEDURE — 95970 PR ANALYZE NEUROSTIM,NO REPROG: ICD-10-PCS | Mod: S$GLB,,, | Performed by: PSYCHIATRY & NEUROLOGY

## 2021-04-17 ENCOUNTER — LAB VISIT (OUTPATIENT)
Dept: FAMILY MEDICINE | Facility: CLINIC | Age: 56
End: 2021-04-17
Payer: MEDICARE

## 2021-04-17 DIAGNOSIS — R41.3 MEMORY LOSS: ICD-10-CM

## 2021-04-17 LAB
ALBUMIN SERPL BCP-MCNC: 3.7 G/DL (ref 3.5–5.2)
ALP SERPL-CCNC: 96 U/L (ref 55–135)
ALT SERPL W/O P-5'-P-CCNC: 20 U/L (ref 10–44)
AMMONIA PLAS-SCNC: 29 UMOL/L (ref 10–50)
ANION GAP SERPL CALC-SCNC: 9 MMOL/L (ref 8–16)
ANISOCYTOSIS BLD QL SMEAR: SLIGHT
AST SERPL-CCNC: 19 U/L (ref 10–40)
BASOPHILS # BLD AUTO: 0.02 K/UL (ref 0–0.2)
BASOPHILS NFR BLD: 0.4 % (ref 0–1.9)
BILIRUB SERPL-MCNC: 0.3 MG/DL (ref 0.1–1)
BUN SERPL-MCNC: 26 MG/DL (ref 6–20)
CALCIUM SERPL-MCNC: 9.1 MG/DL (ref 8.7–10.5)
CHLORIDE SERPL-SCNC: 108 MMOL/L (ref 95–110)
CO2 SERPL-SCNC: 23 MMOL/L (ref 23–29)
CREAT SERPL-MCNC: 1.6 MG/DL (ref 0.5–1.4)
DIFFERENTIAL METHOD: ABNORMAL
EOSINOPHIL # BLD AUTO: 0.1 K/UL (ref 0–0.5)
EOSINOPHIL NFR BLD: 1.8 % (ref 0–8)
ERYTHROCYTE [DISTWIDTH] IN BLOOD BY AUTOMATED COUNT: 15.2 % (ref 11.5–14.5)
EST. GFR  (AFRICAN AMERICAN): 55.2 ML/MIN/1.73 M^2
EST. GFR  (NON AFRICAN AMERICAN): 47.8 ML/MIN/1.73 M^2
FOLATE SERPL-MCNC: 4.6 NG/ML (ref 4–24)
GLUCOSE SERPL-MCNC: 111 MG/DL (ref 70–110)
HCT VFR BLD AUTO: 35.1 % (ref 40–54)
HGB BLD-MCNC: 10.8 G/DL (ref 14–18)
IMM GRANULOCYTES # BLD AUTO: 0.05 K/UL (ref 0–0.04)
IMM GRANULOCYTES NFR BLD AUTO: 1 % (ref 0–0.5)
LYMPHOCYTES # BLD AUTO: 1 K/UL (ref 1–4.8)
LYMPHOCYTES NFR BLD: 20.8 % (ref 18–48)
MCH RBC QN AUTO: 25.5 PG (ref 27–31)
MCHC RBC AUTO-ENTMCNC: 30.8 G/DL (ref 32–36)
MCV RBC AUTO: 83 FL (ref 82–98)
MONOCYTES # BLD AUTO: 0.4 K/UL (ref 0.3–1)
MONOCYTES NFR BLD: 7.5 % (ref 4–15)
NEUTROPHILS # BLD AUTO: 3.4 K/UL (ref 1.8–7.7)
NEUTROPHILS NFR BLD: 68.5 % (ref 38–73)
NRBC BLD-RTO: 0 /100 WBC
PLATELET # BLD AUTO: 174 K/UL (ref 150–450)
PLATELET BLD QL SMEAR: ABNORMAL
PMV BLD AUTO: 11.6 FL (ref 9.2–12.9)
POTASSIUM SERPL-SCNC: 4.9 MMOL/L (ref 3.5–5.1)
PROT SERPL-MCNC: 7.1 G/DL (ref 6–8.4)
RBC # BLD AUTO: 4.24 M/UL (ref 4.6–6.2)
SODIUM SERPL-SCNC: 140 MMOL/L (ref 136–145)
TSH SERPL DL<=0.005 MIU/L-ACNC: 2.16 UIU/ML (ref 0.4–4)
VIT B12 SERPL-MCNC: 898 PG/ML (ref 210–950)
WBC # BLD AUTO: 4.91 K/UL (ref 3.9–12.7)

## 2021-04-17 PROCEDURE — 84443 ASSAY THYROID STIM HORMONE: CPT | Performed by: PSYCHIATRY & NEUROLOGY

## 2021-04-17 PROCEDURE — 86592 SYPHILIS TEST NON-TREP QUAL: CPT | Performed by: PSYCHIATRY & NEUROLOGY

## 2021-04-17 PROCEDURE — 36415 COLL VENOUS BLD VENIPUNCTURE: CPT | Mod: S$GLB,,, | Performed by: PSYCHIATRY & NEUROLOGY

## 2021-04-17 PROCEDURE — 86780 TREPONEMA PALLIDUM: CPT | Performed by: PSYCHIATRY & NEUROLOGY

## 2021-04-17 PROCEDURE — 36415 COLL VENOUS BLD VENIPUNCTURE: CPT | Performed by: PSYCHIATRY & NEUROLOGY

## 2021-04-17 PROCEDURE — 82607 VITAMIN B-12: CPT | Performed by: PSYCHIATRY & NEUROLOGY

## 2021-04-17 PROCEDURE — 36415 PR COLLECTION VENOUS BLOOD,VENIPUNCTURE: ICD-10-PCS | Mod: S$GLB,,, | Performed by: PSYCHIATRY & NEUROLOGY

## 2021-04-17 PROCEDURE — 82140 ASSAY OF AMMONIA: CPT | Performed by: PSYCHIATRY & NEUROLOGY

## 2021-04-17 PROCEDURE — 80053 COMPREHEN METABOLIC PANEL: CPT | Performed by: PSYCHIATRY & NEUROLOGY

## 2021-04-17 PROCEDURE — 82746 ASSAY OF FOLIC ACID SERUM: CPT | Performed by: PSYCHIATRY & NEUROLOGY

## 2021-04-17 PROCEDURE — 85025 COMPLETE CBC W/AUTO DIFF WBC: CPT | Performed by: PSYCHIATRY & NEUROLOGY

## 2021-04-17 PROCEDURE — 86593 SYPHILIS TEST NON-TREP QUANT: CPT | Performed by: PSYCHIATRY & NEUROLOGY

## 2021-04-19 LAB
RPR SER QL: REACTIVE
RPR SER-TITR: ABNORMAL {TITER}

## 2021-04-21 LAB — T PALLIDUM AB SER QL IF: NORMAL

## 2021-05-10 ENCOUNTER — PATIENT MESSAGE (OUTPATIENT)
Dept: RESEARCH | Facility: HOSPITAL | Age: 56
End: 2021-05-10

## 2021-06-03 RX ORDER — LACOSAMIDE 200 MG/1
TABLET ORAL
Qty: 180 TABLET | Refills: 0 | Status: SHIPPED | OUTPATIENT
Start: 2021-06-03 | End: 2021-06-24 | Stop reason: SDUPTHER

## 2021-06-24 ENCOUNTER — TELEPHONE (OUTPATIENT)
Dept: NEUROLOGY | Facility: CLINIC | Age: 56
End: 2021-06-24

## 2021-06-24 RX ORDER — LACOSAMIDE 200 MG/1
TABLET ORAL
Qty: 180 TABLET | Refills: 0 | Status: SHIPPED | OUTPATIENT
Start: 2021-06-24 | End: 2021-09-27 | Stop reason: SDUPTHER

## 2021-09-01 ENCOUNTER — TELEPHONE (OUTPATIENT)
Dept: NEUROLOGY | Facility: CLINIC | Age: 56
End: 2021-09-01

## 2021-09-09 ENCOUNTER — TELEPHONE (OUTPATIENT)
Dept: NEUROLOGY | Facility: CLINIC | Age: 56
End: 2021-09-09

## 2021-09-27 RX ORDER — LACOSAMIDE 200 MG/1
TABLET ORAL
Qty: 180 TABLET | Refills: 1 | Status: SHIPPED | OUTPATIENT
Start: 2021-09-27 | End: 2022-01-11 | Stop reason: SDUPTHER

## 2021-10-13 ENCOUNTER — TELEPHONE (OUTPATIENT)
Dept: NEUROLOGY | Facility: CLINIC | Age: 56
End: 2021-10-13

## 2021-10-27 ENCOUNTER — OFFICE VISIT (OUTPATIENT)
Dept: NEUROLOGY | Facility: CLINIC | Age: 56
End: 2021-10-27
Payer: MEDICARE

## 2021-10-27 VITALS
HEART RATE: 60 BPM | BODY MASS INDEX: 26.09 KG/M2 | RESPIRATION RATE: 17 BRPM | SYSTOLIC BLOOD PRESSURE: 160 MMHG | WEIGHT: 192.38 LBS | TEMPERATURE: 98 F | DIASTOLIC BLOOD PRESSURE: 91 MMHG

## 2021-10-27 DIAGNOSIS — G40.219 COMPLEX PARTIAL EPILEPSY WITH GENERALIZATION AND WITH INTRACTABLE EPILEPSY: Primary | ICD-10-CM

## 2021-10-27 DIAGNOSIS — R41.3 MEMORY LOSS: ICD-10-CM

## 2021-10-27 DIAGNOSIS — Z96.89 S/P PLACEMENT OF VNS (VAGUS NERVE STIMULATION) DEVICE: ICD-10-CM

## 2021-10-27 PROCEDURE — 3080F DIAST BP >= 90 MM HG: CPT | Mod: CPTII,S$GLB,, | Performed by: PSYCHIATRY & NEUROLOGY

## 2021-10-27 PROCEDURE — 1159F MED LIST DOCD IN RCRD: CPT | Mod: CPTII,S$GLB,, | Performed by: PSYCHIATRY & NEUROLOGY

## 2021-10-27 PROCEDURE — 95970 PR ANALYZE NEUROSTIM,NO REPROG: ICD-10-PCS | Mod: S$GLB,,, | Performed by: PSYCHIATRY & NEUROLOGY

## 2021-10-27 PROCEDURE — 3080F PR MOST RECENT DIASTOLIC BLOOD PRESSURE >= 90 MM HG: ICD-10-PCS | Mod: CPTII,S$GLB,, | Performed by: PSYCHIATRY & NEUROLOGY

## 2021-10-27 PROCEDURE — 3077F SYST BP >= 140 MM HG: CPT | Mod: CPTII,S$GLB,, | Performed by: PSYCHIATRY & NEUROLOGY

## 2021-10-27 PROCEDURE — 1159F PR MEDICATION LIST DOCUMENTED IN MEDICAL RECORD: ICD-10-PCS | Mod: CPTII,S$GLB,, | Performed by: PSYCHIATRY & NEUROLOGY

## 2021-10-27 PROCEDURE — 99213 PR OFFICE/OUTPT VISIT, EST, LEVL III, 20-29 MIN: ICD-10-PCS | Mod: S$GLB,,, | Performed by: PSYCHIATRY & NEUROLOGY

## 2021-10-27 PROCEDURE — 99213 OFFICE O/P EST LOW 20 MIN: CPT | Mod: S$GLB,,, | Performed by: PSYCHIATRY & NEUROLOGY

## 2021-10-27 PROCEDURE — 1160F RVW MEDS BY RX/DR IN RCRD: CPT | Mod: CPTII,S$GLB,, | Performed by: PSYCHIATRY & NEUROLOGY

## 2021-10-27 PROCEDURE — 99999 PR PBB SHADOW E&M-EST. PATIENT-LVL IV: CPT | Mod: PBBFAC,,, | Performed by: PSYCHIATRY & NEUROLOGY

## 2021-10-27 PROCEDURE — 3008F PR BODY MASS INDEX (BMI) DOCUMENTED: ICD-10-PCS | Mod: CPTII,S$GLB,, | Performed by: PSYCHIATRY & NEUROLOGY

## 2021-10-27 PROCEDURE — 3077F PR MOST RECENT SYSTOLIC BLOOD PRESSURE >= 140 MM HG: ICD-10-PCS | Mod: CPTII,S$GLB,, | Performed by: PSYCHIATRY & NEUROLOGY

## 2021-10-27 PROCEDURE — 99999 PR PBB SHADOW E&M-EST. PATIENT-LVL IV: ICD-10-PCS | Mod: PBBFAC,,, | Performed by: PSYCHIATRY & NEUROLOGY

## 2021-10-27 PROCEDURE — 3008F BODY MASS INDEX DOCD: CPT | Mod: CPTII,S$GLB,, | Performed by: PSYCHIATRY & NEUROLOGY

## 2021-10-27 PROCEDURE — 95970 ALYS NPGT W/O PRGRMG: CPT | Mod: S$GLB,,, | Performed by: PSYCHIATRY & NEUROLOGY

## 2021-10-27 PROCEDURE — 1160F PR REVIEW ALL MEDS BY PRESCRIBER/CLIN PHARMACIST DOCUMENTED: ICD-10-PCS | Mod: CPTII,S$GLB,, | Performed by: PSYCHIATRY & NEUROLOGY

## 2021-10-27 RX ORDER — LIDOCAINE HYDROCHLORIDE AND EPINEPHRINE 10; 10 MG/ML; UG/ML
5 INJECTION, SOLUTION INFILTRATION; PERINEURAL
COMMUNITY
Start: 2021-05-26

## 2022-01-11 ENCOUNTER — OFFICE VISIT (OUTPATIENT)
Dept: NEUROLOGY | Facility: CLINIC | Age: 57
End: 2022-01-11
Payer: MEDICARE

## 2022-01-11 DIAGNOSIS — G40.219 COMPLEX PARTIAL EPILEPSY WITH GENERALIZATION AND WITH INTRACTABLE EPILEPSY: Primary | ICD-10-CM

## 2022-01-11 DIAGNOSIS — F41.1 GENERALIZED ANXIETY DISORDER: Primary | ICD-10-CM

## 2022-01-11 DIAGNOSIS — R41.3 MEMORY LOSS: ICD-10-CM

## 2022-01-11 DIAGNOSIS — G40.909 SEIZURE DISORDER: ICD-10-CM

## 2022-01-11 DIAGNOSIS — I10 ESSENTIAL HYPERTENSION: ICD-10-CM

## 2022-01-11 DIAGNOSIS — Z96.89 S/P PLACEMENT OF VNS (VAGUS NERVE STIMULATION) DEVICE: ICD-10-CM

## 2022-01-11 DIAGNOSIS — G40.219 COMPLEX PARTIAL EPILEPSY WITH GENERALIZATION AND WITH INTRACTABLE EPILEPSY: ICD-10-CM

## 2022-01-11 PROCEDURE — 99499 NO LOS: ICD-10-PCS | Mod: S$GLB,,, | Performed by: STUDENT IN AN ORGANIZED HEALTH CARE EDUCATION/TRAINING PROGRAM

## 2022-01-11 PROCEDURE — 90791 PR PSYCHIATRIC DIAGNOSTIC EVALUATION: ICD-10-PCS | Mod: S$GLB,,, | Performed by: STUDENT IN AN ORGANIZED HEALTH CARE EDUCATION/TRAINING PROGRAM

## 2022-01-11 PROCEDURE — 99999 PR PBB SHADOW E&M-EST. PATIENT-LVL I: CPT | Mod: PBBFAC,,, | Performed by: STUDENT IN AN ORGANIZED HEALTH CARE EDUCATION/TRAINING PROGRAM

## 2022-01-11 PROCEDURE — 96133 NRPSYC TST EVAL PHYS/QHP EA: CPT | Mod: S$GLB,,, | Performed by: STUDENT IN AN ORGANIZED HEALTH CARE EDUCATION/TRAINING PROGRAM

## 2022-01-11 PROCEDURE — 96138 PSYCL/NRPSYC TECH 1ST: CPT | Mod: S$GLB,,, | Performed by: STUDENT IN AN ORGANIZED HEALTH CARE EDUCATION/TRAINING PROGRAM

## 2022-01-11 PROCEDURE — 96132 NRPSYC TST EVAL PHYS/QHP 1ST: CPT | Mod: S$GLB,,, | Performed by: STUDENT IN AN ORGANIZED HEALTH CARE EDUCATION/TRAINING PROGRAM

## 2022-01-11 PROCEDURE — 96133 PR NEUROPSYCHOLOGIC TEST EVAL SVCS, EA ADDTL HR: ICD-10-PCS | Mod: S$GLB,,, | Performed by: STUDENT IN AN ORGANIZED HEALTH CARE EDUCATION/TRAINING PROGRAM

## 2022-01-11 PROCEDURE — 96132 PR NEUROPSYCHOLOGIC TEST EVAL SVCS, 1ST HR: ICD-10-PCS | Mod: S$GLB,,, | Performed by: STUDENT IN AN ORGANIZED HEALTH CARE EDUCATION/TRAINING PROGRAM

## 2022-01-11 PROCEDURE — 96138 PR PSYCH/NEUROPSYCH TEST ADMIN/SCORING, BY TECH, 2+ TESTS, 1ST 30 MIN: ICD-10-PCS | Mod: S$GLB,,, | Performed by: STUDENT IN AN ORGANIZED HEALTH CARE EDUCATION/TRAINING PROGRAM

## 2022-01-11 PROCEDURE — 96139 PSYCL/NRPSYC TST TECH EA: CPT | Mod: S$GLB,,, | Performed by: STUDENT IN AN ORGANIZED HEALTH CARE EDUCATION/TRAINING PROGRAM

## 2022-01-11 PROCEDURE — 99499 UNLISTED E&M SERVICE: CPT | Mod: S$GLB,,, | Performed by: STUDENT IN AN ORGANIZED HEALTH CARE EDUCATION/TRAINING PROGRAM

## 2022-01-11 PROCEDURE — 99999 PR PBB SHADOW E&M-EST. PATIENT-LVL I: ICD-10-PCS | Mod: PBBFAC,,, | Performed by: STUDENT IN AN ORGANIZED HEALTH CARE EDUCATION/TRAINING PROGRAM

## 2022-01-11 PROCEDURE — 90791 PSYCH DIAGNOSTIC EVALUATION: CPT | Mod: S$GLB,,, | Performed by: STUDENT IN AN ORGANIZED HEALTH CARE EDUCATION/TRAINING PROGRAM

## 2022-01-11 PROCEDURE — 96139 PR PSYCH/NEUROPSYCH TEST ADMIN/SCORING, BY TECH, 2+ TESTS, EA ADDTL 30 MIN: ICD-10-PCS | Mod: S$GLB,,, | Performed by: STUDENT IN AN ORGANIZED HEALTH CARE EDUCATION/TRAINING PROGRAM

## 2022-01-11 NOTE — TELEPHONE ENCOUNTER
Spoke to Kalpana with [facility] Patient Assistance Program--she states that Medicare's new guidelines require pts to be re-approved for the program and they are backed up with applications. She recommends getting the pt a sample of the medication to hold him over. Spoke to pt's wife--informed her of the update from [facility]. And advised her that we can send a 2 week supply of Vimpat to the [facility] and use a voucher for the medications. She v/u.

## 2022-01-12 RX ORDER — LACOSAMIDE 200 MG/1
TABLET ORAL
Qty: 28 TABLET | Refills: 0 | Status: SHIPPED | OUTPATIENT
Start: 2022-01-12 | End: 2022-04-07
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
## 2022-01-19 ENCOUNTER — TELEPHONE (OUTPATIENT)
Dept: NEUROLOGY | Facility: CLINIC | Age: 57
End: 2022-01-19
Payer: MEDICARE

## 2022-01-19 NOTE — TELEPHONE ENCOUNTER
Attempted call to patient for scheduled feedback session to review neuropsychological evaluation results. Left voice message. Will reschedule feedback.

## 2022-01-24 ENCOUNTER — TELEPHONE (OUTPATIENT)
Dept: NEUROLOGY | Facility: CLINIC | Age: 57
End: 2022-01-24
Payer: MEDICARE

## 2022-01-25 ENCOUNTER — TELEPHONE (OUTPATIENT)
Dept: NEUROLOGY | Facility: CLINIC | Age: 57
End: 2022-01-25
Payer: MEDICARE

## 2022-01-25 NOTE — TELEPHONE ENCOUNTER
----- Message from Debbie Kessler sent at 1/25/2022 12:39 PM CST -----  Type: Patient Call Back         Who called: Patient's wife - Ashlyn          What is the request in detail: calling to get another refill for lacosamide (VIMPAT) 200 mg Tab tablet; also requesting a call back form nurse; states it's urgent; may be in regards to a wait list for a program; please advise         Best call back number: [phone]         Additional Information:     Wants to see if Rx can be picked up from local pharm rather than [facility]    [facility] - [facility] [address]  [address] [address]  [facility] [address]  Phone: [phone] Fax: [phone]                 Thank You

## 2022-01-25 NOTE — TELEPHONE ENCOUNTER
Recently, it was brought to the clinics attention that Vimpat would have a generic available in Feb 2022. Unfortunately, insurances are no longer wanting to provide coverage for this medication and patient is going without their medication due to the expense. We have exhausted all possibilities with this pt, including a free 2 week trial while waiting on the insurance to communicate with [employer] to get the pt approved for the RX assistance program. Message left with [employer] to reach out to us promptly to update on the status of assistance. Unable to reach insurance company today, on hold for 11 min then cut off. The pt reports he is out of medication as of today. Please advise.

## 2022-01-26 ENCOUNTER — TELEPHONE (OUTPATIENT)
Dept: NEUROLOGY | Facility: CLINIC | Age: 57
End: 2022-01-26
Payer: MEDICARE

## 2022-01-26 NOTE — TELEPHONE ENCOUNTER
PA submitted through CoverMyMeds for Onfi 10mg. Approval fax recevied from [facility]. Authorization good until 12/31/2022. Original sent to scanning. Copy placed in alphabetical file.

## 2022-01-26 NOTE — TELEPHONE ENCOUNTER
Spoke to pt's wife. Advised her that a 30day supply of Onfi 10mg BID was sent to [facility] and that we reached out to the pt's insurance and [facility]. Once we receive an update we will contact her.

## 2022-04-13 DIAGNOSIS — G40.219 COMPLEX PARTIAL EPILEPSY WITH GENERALIZATION AND WITH INTRACTABLE EPILEPSY: ICD-10-CM

## 2022-04-13 DIAGNOSIS — G40.909 SEIZURE DISORDER: ICD-10-CM

## 2022-04-13 RX ORDER — LACOSAMIDE 200 MG/1
200 TABLET ORAL EVERY 12 HOURS
Qty: 60 TABLET | Refills: 1 | Status: SHIPPED | OUTPATIENT
Start: 2022-04-13 | End: 2022-05-05

## 2022-04-13 NOTE — TELEPHONE ENCOUNTER
----- Message from Racheal Liriano sent at 4/13/2022  3:22 PM CDT -----  Contact[facility]  Type:  Pharmacy Calling to Clarify an RX    Name of Caller:  Sammi  Pharmacy Name:  [facility]  Prescription Name: lacosamide (VIMPAT) 200 mg Tab tablet   What do they need to clarify?:  They received the rx, but she stated that in [place] they cannot fill the medication signed by the Nurse practitioner, asking if we can resend this prescription signed by the physician.  Best Call Back Number:  [phone]  Additional Information:  Thank you.

## 2022-04-22 ENCOUNTER — TELEPHONE (OUTPATIENT)
Dept: NEUROLOGY | Facility: CLINIC | Age: 57
End: 2022-04-22
Payer: MEDICARE

## 2022-04-22 NOTE — TELEPHONE ENCOUNTER
Spoke to pt's wife, Ashlyn- advised her that the schedule for Dr. Castrejon should be open soon and I put a reminder in to call her back once it is available to schedule on. Also informed her that Vimpat refill was recently sent to [facility] on 4/13/22 with 1 additional refill. Pt states [facility] told her that it was only one month. Advised her that they accepted this Rx on 4/13/22 and the Rx does have an additional refill attached. She v/u.

## 2022-04-22 NOTE — TELEPHONE ENCOUNTER
----- Message from Shaar Conn sent at 4/22/2022  8:23 AM CDT -----  Regarding: appointment  Contact: patient  Type:  Sooner Appointment Request    Caller is requesting a sooner appointment.  Caller declined first available appointment listed below.  Caller will not accept being placed on the waitlist and is requesting a message be sent to doctor.    Name of Caller:  wife, Ashlyn Morgan  When is the first available appointment?    Symptoms:  medication refill, dizziness, recent episode  Best Call Back Number:  [phone] (home)   Additional Information:  Patient only has a 30 day prescription of his medication. He needs to be seen before he can get another refill. Please call patient's wife to advise.Thanks!

## 2022-06-01 DIAGNOSIS — G40.909 SEIZURE DISORDER: ICD-10-CM

## 2022-06-01 DIAGNOSIS — G40.219 COMPLEX PARTIAL EPILEPSY WITH GENERALIZATION AND WITH INTRACTABLE EPILEPSY: ICD-10-CM

## 2022-06-01 RX ORDER — LACOSAMIDE 200 MG/1
200 TABLET ORAL EVERY 12 HOURS
Qty: 60 TABLET | Refills: 2 | Status: SHIPPED | OUTPATIENT
Start: 2022-06-01 | End: 2022-07-28 | Stop reason: SDUPTHER

## 2022-06-01 NOTE — TELEPHONE ENCOUNTER
----- Message from Rashaad Chery sent at 6/1/2022  3:46 PM CDT -----  Regarding: refill  Type:  RX Refill Request    Who Called:  wife // carlos  Refill or New Rx:  refill    RX Name and Strength:    VIMPAT 200 mg Tab tablet 60 tablet 0 5/5/2022    Sig: Take 1 tablet (200mg total) by mouth twice a day (every 12 hours) as directed by physician.   Sent to pharmacy as: VIMPAT 200 mg Tab tablet   E-Prescribing Status: Receipt confirmed by pharmacy (5/5/2022  2:25 PM CDT)     How is the patient currently taking it? (ex. 1XDay):  see above    Is this a 30 day or 90 day RX:  30-day    Preferred Pharmacy with phone number:    [email or web address] - [place], [place] - [address]  [address]  [facility] [address]  Phone: [phone] Fax: [phone]    Local or Mail Order:  mail order    Ordering Provider:  Dr Lucía Leon Call Back Number:  [phone]     Additional Information:  pt has upcoming appt schedule in July. Was only avail appt as of time of this msg. Pt only has 2wks of med left. Please call to discuss refill.

## 2022-07-28 ENCOUNTER — OFFICE VISIT (OUTPATIENT)
Dept: NEUROLOGY | Facility: CLINIC | Age: 57
End: 2022-07-28
Payer: MEDICARE

## 2022-07-28 VITALS
HEART RATE: 58 BPM | BODY MASS INDEX: 23.95 KG/M2 | DIASTOLIC BLOOD PRESSURE: 78 MMHG | SYSTOLIC BLOOD PRESSURE: 139 MMHG | RESPIRATION RATE: 18 BRPM | WEIGHT: 176.56 LBS

## 2022-07-28 DIAGNOSIS — G40.219 COMPLEX PARTIAL EPILEPSY WITH GENERALIZATION AND WITH INTRACTABLE EPILEPSY: ICD-10-CM

## 2022-07-28 DIAGNOSIS — G40.909 SEIZURE DISORDER: ICD-10-CM

## 2022-07-28 PROCEDURE — 3078F DIAST BP <80 MM HG: CPT | Mod: CPTII,S$GLB,, | Performed by: PSYCHIATRY & NEUROLOGY

## 2022-07-28 PROCEDURE — 3075F PR MOST RECENT SYSTOLIC BLOOD PRESS GE 130-139MM HG: ICD-10-PCS | Mod: CPTII,S$GLB,, | Performed by: PSYCHIATRY & NEUROLOGY

## 2022-07-28 PROCEDURE — 99999 PR PBB SHADOW E&M-EST. PATIENT-LVL III: CPT | Mod: PBBFAC,,, | Performed by: PSYCHIATRY & NEUROLOGY

## 2022-07-28 PROCEDURE — 95970 ALYS NPGT W/O PRGRMG: CPT | Mod: S$GLB,,, | Performed by: PSYCHIATRY & NEUROLOGY

## 2022-07-28 PROCEDURE — 99214 PR OFFICE/OUTPT VISIT, EST, LEVL IV, 30-39 MIN: ICD-10-PCS | Mod: 25,S$GLB,, | Performed by: PSYCHIATRY & NEUROLOGY

## 2022-07-28 PROCEDURE — 3078F PR MOST RECENT DIASTOLIC BLOOD PRESSURE < 80 MM HG: ICD-10-PCS | Mod: CPTII,S$GLB,, | Performed by: PSYCHIATRY & NEUROLOGY

## 2022-07-28 PROCEDURE — 3008F PR BODY MASS INDEX (BMI) DOCUMENTED: ICD-10-PCS | Mod: CPTII,S$GLB,, | Performed by: PSYCHIATRY & NEUROLOGY

## 2022-07-28 PROCEDURE — 99214 OFFICE O/P EST MOD 30 MIN: CPT | Mod: 25,S$GLB,, | Performed by: PSYCHIATRY & NEUROLOGY

## 2022-07-28 PROCEDURE — 1159F MED LIST DOCD IN RCRD: CPT | Mod: CPTII,S$GLB,, | Performed by: PSYCHIATRY & NEUROLOGY

## 2022-07-28 PROCEDURE — 3008F BODY MASS INDEX DOCD: CPT | Mod: CPTII,S$GLB,, | Performed by: PSYCHIATRY & NEUROLOGY

## 2022-07-28 PROCEDURE — 3075F SYST BP GE 130 - 139MM HG: CPT | Mod: CPTII,S$GLB,, | Performed by: PSYCHIATRY & NEUROLOGY

## 2022-07-28 PROCEDURE — 99999 PR PBB SHADOW E&M-EST. PATIENT-LVL III: ICD-10-PCS | Mod: PBBFAC,,, | Performed by: PSYCHIATRY & NEUROLOGY

## 2022-07-28 PROCEDURE — 1159F PR MEDICATION LIST DOCUMENTED IN MEDICAL RECORD: ICD-10-PCS | Mod: CPTII,S$GLB,, | Performed by: PSYCHIATRY & NEUROLOGY

## 2022-07-28 PROCEDURE — 95970 PR ANALYZE NEUROSTIM,NO REPROG: ICD-10-PCS | Mod: S$GLB,,, | Performed by: PSYCHIATRY & NEUROLOGY

## 2022-07-28 RX ORDER — SERTRALINE HYDROCHLORIDE 50 MG/1
50 TABLET, FILM COATED ORAL DAILY
Qty: 30 TABLET | Refills: 11 | Status: SHIPPED | OUTPATIENT
Start: 2022-07-28 | End: 2025-03-19

## 2022-07-28 RX ORDER — LACOSAMIDE 200 MG/1
200 TABLET ORAL EVERY 12 HOURS
Qty: 60 TABLET | Refills: 5 | Status: SHIPPED | OUTPATIENT
Start: 2022-07-28 | End: 2023-01-20

## 2022-07-28 RX ORDER — LAMOTRIGINE 100 MG/1
100 TABLET ORAL 2 TIMES DAILY
Qty: 60 TABLET | Refills: 11 | Status: SHIPPED | OUTPATIENT
Start: 2022-07-28 | End: 2023-08-03 | Stop reason: SDUPTHER
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
## 2023-04-19 ENCOUNTER — OFFICE VISIT (OUTPATIENT)
Dept: NEUROLOGY | Facility: CLINIC | Age: 58
End: 2023-04-19
Payer: MEDICARE

## 2023-04-19 VITALS
WEIGHT: 177.56 LBS | RESPIRATION RATE: 17 BRPM | SYSTOLIC BLOOD PRESSURE: 127 MMHG | BODY MASS INDEX: 24.05 KG/M2 | HEART RATE: 54 BPM | HEIGHT: 72 IN | DIASTOLIC BLOOD PRESSURE: 76 MMHG

## 2023-04-19 DIAGNOSIS — Z96.89 S/P PLACEMENT OF VNS (VAGUS NERVE STIMULATION) DEVICE: ICD-10-CM

## 2023-04-19 DIAGNOSIS — G40.219 COMPLEX PARTIAL EPILEPSY WITH GENERALIZATION AND WITH INTRACTABLE EPILEPSY: Primary | ICD-10-CM

## 2023-04-19 PROCEDURE — 3074F SYST BP LT 130 MM HG: CPT | Mod: CPTII,S$GLB,, | Performed by: PSYCHIATRY & NEUROLOGY

## 2023-04-19 PROCEDURE — 1159F PR MEDICATION LIST DOCUMENTED IN MEDICAL RECORD: ICD-10-PCS | Mod: CPTII,S$GLB,, | Performed by: PSYCHIATRY & NEUROLOGY

## 2023-04-19 PROCEDURE — 3078F DIAST BP <80 MM HG: CPT | Mod: CPTII,S$GLB,, | Performed by: PSYCHIATRY & NEUROLOGY

## 2023-04-19 PROCEDURE — 3074F PR MOST RECENT SYSTOLIC BLOOD PRESSURE < 130 MM HG: ICD-10-PCS | Mod: CPTII,S$GLB,, | Performed by: PSYCHIATRY & NEUROLOGY

## 2023-04-19 PROCEDURE — 95970 PR ANALYZE NEUROSTIM,NO REPROG: ICD-10-PCS | Mod: S$GLB,,, | Performed by: PSYCHIATRY & NEUROLOGY

## 2023-04-19 PROCEDURE — 99213 PR OFFICE/OUTPT VISIT, EST, LEVL III, 20-29 MIN: ICD-10-PCS | Mod: 25,S$GLB,, | Performed by: PSYCHIATRY & NEUROLOGY

## 2023-04-19 PROCEDURE — 3078F PR MOST RECENT DIASTOLIC BLOOD PRESSURE < 80 MM HG: ICD-10-PCS | Mod: CPTII,S$GLB,, | Performed by: PSYCHIATRY & NEUROLOGY

## 2023-04-19 PROCEDURE — 3008F PR BODY MASS INDEX (BMI) DOCUMENTED: ICD-10-PCS | Mod: CPTII,S$GLB,, | Performed by: PSYCHIATRY & NEUROLOGY

## 2023-04-19 PROCEDURE — 95970 ALYS NPGT W/O PRGRMG: CPT | Mod: S$GLB,,, | Performed by: PSYCHIATRY & NEUROLOGY

## 2023-04-19 PROCEDURE — 3008F BODY MASS INDEX DOCD: CPT | Mod: CPTII,S$GLB,, | Performed by: PSYCHIATRY & NEUROLOGY

## 2023-04-19 PROCEDURE — 99213 OFFICE O/P EST LOW 20 MIN: CPT | Mod: 25,S$GLB,, | Performed by: PSYCHIATRY & NEUROLOGY

## 2023-04-19 PROCEDURE — 99999 PR PBB SHADOW E&M-EST. PATIENT-LVL III: ICD-10-PCS | Mod: PBBFAC,,, | Performed by: PSYCHIATRY & NEUROLOGY

## 2023-04-19 PROCEDURE — 4010F ACE/ARB THERAPY RXD/TAKEN: CPT | Mod: CPTII,S$GLB,, | Performed by: PSYCHIATRY & NEUROLOGY

## 2023-04-19 PROCEDURE — 99999 PR PBB SHADOW E&M-EST. PATIENT-LVL III: CPT | Mod: PBBFAC,,, | Performed by: PSYCHIATRY & NEUROLOGY

## 2023-04-19 PROCEDURE — 4010F PR ACE/ARB THEARPY RXD/TAKEN: ICD-10-PCS | Mod: CPTII,S$GLB,, | Performed by: PSYCHIATRY & NEUROLOGY

## 2023-04-19 PROCEDURE — 1159F MED LIST DOCD IN RCRD: CPT | Mod: CPTII,S$GLB,, | Performed by: PSYCHIATRY & NEUROLOGY

## 2023-04-19 RX ORDER — TESTOSTERONE CYPIONATE 200 MG/ML
INJECTION, SOLUTION INTRAMUSCULAR
COMMUNITY
Start: 2023-03-27
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
## 2023-07-06 DIAGNOSIS — G40.909 SEIZURE DISORDER: ICD-10-CM

## 2023-07-06 DIAGNOSIS — G40.219 COMPLEX PARTIAL EPILEPSY WITH GENERALIZATION AND WITH INTRACTABLE EPILEPSY: ICD-10-CM

## 2023-07-06 RX ORDER — LACOSAMIDE 200 MG/1
200 TABLET ORAL EVERY 12 HOURS
Qty: 60 TABLET | Refills: 4 | Status: SHIPPED | OUTPATIENT
Start: 2023-07-06 | End: 2023-10-31 | Stop reason: SDUPTHER

## 2023-07-06 NOTE — TELEPHONE ENCOUNTER
----- Message from Carolin [place] sent at 7/6/2023  9:47 AM CDT -----  Contact: Yael from [employer]  Type:  Needs Medical Advice    Who Called: Yael from [facility]    Pharmacy name and phone #:      [facility] - [place], [place] - [address]  [address]  [place] [address]  Phone: [phone] Fax: [phone]      Would the patient rather a call back or a response via [facility]? call  Best Call Back Number: [phone] (home)     Additional Information: pt needs a refill on VIMPAT 200 mg Tab tablet. His former dr was Dr. Castrejon. Not sure who I should have sent the message to.  Please advise and thank you.

## 2023-08-03 DIAGNOSIS — G40.219 COMPLEX PARTIAL EPILEPSY WITH GENERALIZATION AND WITH INTRACTABLE EPILEPSY: ICD-10-CM

## 2023-08-03 RX ORDER — LAMOTRIGINE 100 MG/1
100 TABLET ORAL 2 TIMES DAILY
Qty: 60 TABLET | Refills: 11 | Status: SHIPPED | OUTPATIENT
Start: 2023-08-03 | End: 2023-10-31 | Stop reason: SDUPTHER

## 2023-10-31 ENCOUNTER — OFFICE VISIT (OUTPATIENT)
Dept: NEUROLOGY | Facility: CLINIC | Age: 58
End: 2023-10-31
Payer: MEDICARE

## 2023-10-31 VITALS
SYSTOLIC BLOOD PRESSURE: 165 MMHG | DIASTOLIC BLOOD PRESSURE: 82 MMHG | HEART RATE: 55 BPM | BODY MASS INDEX: 23.1 KG/M2 | WEIGHT: 170.31 LBS

## 2023-10-31 DIAGNOSIS — Z96.89 S/P PLACEMENT OF VNS (VAGUS NERVE STIMULATION) DEVICE: ICD-10-CM

## 2023-10-31 DIAGNOSIS — M85.80 OSTEOPENIA, UNSPECIFIED LOCATION: Primary | ICD-10-CM

## 2023-10-31 DIAGNOSIS — G40.909 SEIZURE DISORDER: ICD-10-CM

## 2023-10-31 DIAGNOSIS — G40.219 COMPLEX PARTIAL EPILEPSY WITH GENERALIZATION AND WITH INTRACTABLE EPILEPSY: ICD-10-CM

## 2023-10-31 PROCEDURE — 1160F RVW MEDS BY RX/DR IN RCRD: CPT | Mod: CPTII,S$GLB,, | Performed by: PSYCHIATRY & NEUROLOGY

## 2023-10-31 PROCEDURE — 3079F PR MOST RECENT DIASTOLIC BLOOD PRESSURE 80-89 MM HG: ICD-10-PCS | Mod: CPTII,S$GLB,, | Performed by: PSYCHIATRY & NEUROLOGY

## 2023-10-31 PROCEDURE — 99215 OFFICE O/P EST HI 40 MIN: CPT | Mod: 25,S$GLB,, | Performed by: PSYCHIATRY & NEUROLOGY

## 2023-10-31 PROCEDURE — 3079F DIAST BP 80-89 MM HG: CPT | Mod: CPTII,S$GLB,, | Performed by: PSYCHIATRY & NEUROLOGY

## 2023-10-31 PROCEDURE — 3077F PR MOST RECENT SYSTOLIC BLOOD PRESSURE >= 140 MM HG: ICD-10-PCS | Mod: CPTII,S$GLB,, | Performed by: PSYCHIATRY & NEUROLOGY

## 2023-10-31 PROCEDURE — 3077F SYST BP >= 140 MM HG: CPT | Mod: CPTII,S$GLB,, | Performed by: PSYCHIATRY & NEUROLOGY

## 2023-10-31 PROCEDURE — 99999 PR PBB SHADOW E&M-EST. PATIENT-LVL III: ICD-10-PCS | Mod: PBBFAC,,, | Performed by: PSYCHIATRY & NEUROLOGY

## 2023-10-31 PROCEDURE — 4010F PR ACE/ARB THEARPY RXD/TAKEN: ICD-10-PCS | Mod: CPTII,S$GLB,, | Performed by: PSYCHIATRY & NEUROLOGY

## 2023-10-31 PROCEDURE — 99215 PR OFFICE/OUTPT VISIT, EST, LEVL V, 40-54 MIN: ICD-10-PCS | Mod: 25,S$GLB,, | Performed by: PSYCHIATRY & NEUROLOGY

## 2023-10-31 PROCEDURE — 3008F PR BODY MASS INDEX (BMI) DOCUMENTED: ICD-10-PCS | Mod: CPTII,S$GLB,, | Performed by: PSYCHIATRY & NEUROLOGY

## 2023-10-31 PROCEDURE — 1159F MED LIST DOCD IN RCRD: CPT | Mod: CPTII,S$GLB,, | Performed by: PSYCHIATRY & NEUROLOGY

## 2023-10-31 PROCEDURE — 95970 ALYS NPGT W/O PRGRMG: CPT | Mod: S$GLB,,, | Performed by: PSYCHIATRY & NEUROLOGY

## 2023-10-31 PROCEDURE — 4010F ACE/ARB THERAPY RXD/TAKEN: CPT | Mod: CPTII,S$GLB,, | Performed by: PSYCHIATRY & NEUROLOGY

## 2023-10-31 PROCEDURE — 1160F PR REVIEW ALL MEDS BY PRESCRIBER/CLIN PHARMACIST DOCUMENTED: ICD-10-PCS | Mod: CPTII,S$GLB,, | Performed by: PSYCHIATRY & NEUROLOGY

## 2023-10-31 PROCEDURE — 99999 PR PBB SHADOW E&M-EST. PATIENT-LVL III: CPT | Mod: PBBFAC,,, | Performed by: PSYCHIATRY & NEUROLOGY

## 2023-10-31 PROCEDURE — 1159F PR MEDICATION LIST DOCUMENTED IN MEDICAL RECORD: ICD-10-PCS | Mod: CPTII,S$GLB,, | Performed by: PSYCHIATRY & NEUROLOGY

## 2023-10-31 PROCEDURE — 3008F BODY MASS INDEX DOCD: CPT | Mod: CPTII,S$GLB,, | Performed by: PSYCHIATRY & NEUROLOGY

## 2023-10-31 PROCEDURE — 95970 PR ANALYZE NEUROSTIM,NO REPROG: ICD-10-PCS | Mod: S$GLB,,, | Performed by: PSYCHIATRY & NEUROLOGY

## 2023-10-31 RX ORDER — LAMOTRIGINE 100 MG/1
100 TABLET ORAL 2 TIMES DAILY
Qty: 180 TABLET | Refills: 3 | Status: SHIPPED | OUTPATIENT
Start: 2023-10-31 | End: 2024-03-19 | Stop reason: SDUPTHER

## 2023-10-31 RX ORDER — LACOSAMIDE 200 MG/1
200 TABLET ORAL EVERY 12 HOURS
Qty: 180 TABLET | Refills: 3 | Status: SHIPPED | OUTPATIENT
Start: 2023-10-31 | End: 2024-02-12 | Stop reason: SDUPTHER
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
## 2024-02-12 DIAGNOSIS — G40.909 SEIZURE DISORDER: ICD-10-CM

## 2024-02-12 DIAGNOSIS — G40.219 COMPLEX PARTIAL EPILEPSY WITH GENERALIZATION AND WITH INTRACTABLE EPILEPSY: ICD-10-CM

## 2024-02-12 RX ORDER — LACOSAMIDE 200 MG/1
200 TABLET ORAL EVERY 12 HOURS
Qty: 180 TABLET | Refills: 1 | Status: SHIPPED | OUTPATIENT
Start: 2024-02-12 | End: 2024-02-20 | Stop reason: SDUPTHER

## 2024-02-12 NOTE — TELEPHONE ENCOUNTER
----- Message from Kellie Wheat sent at 2/12/2024 11:31 AM CST -----  Contact: Patient  Type:  RX Refill Request    Who Called: Patient     Refill or New Rx:refill    RX Name and Strength:lacosamide (VIMPAT) 200 mg Tab tablet    How is the patient currently taking it? (ex. 1XDay): 2 x day     Is this a 30 day or 90 day RX:90    Preferred Pharmacy with phone number:    "[facility] [employer] [address]  [address]  [facility] [address]  Phone: [phone] Fax: [phone]      Local or Mail Order:Mail     Ordering Provider:Jonny    Would the patient rather a call back or a response via [facility]? Call    Best Call Back Number:[phone] (home)     Additional Information: Please call to advise

## 2024-02-14 ENCOUNTER — TELEPHONE (OUTPATIENT)
Dept: NEUROLOGY | Facility: CLINIC | Age: 59
End: 2024-02-14
Payer: MEDICARE

## 2024-02-14 NOTE — TELEPHONE ENCOUNTER
Spoke with patients wife and informed patient needs to come in and fill out a portion of the renewal application and sign it. Patient will come in on Monday 2/19.

## 2024-02-14 NOTE — TELEPHONE ENCOUNTER
Spoke with patients wife and informed the renewal form for Vimpat is filled out but needs Dr. Fuller signature and she is out of the clinic until 2/20/24. Patients wife states she has been trying to reach the pharmacy for over a week and has not been able to speak with anyone until today.

## 2024-02-14 NOTE — TELEPHONE ENCOUNTER
----- Message from Racheal Liriano sent at 2/14/2024 10:01 AM CST -----  Contact: Wife Ashlyn Chavez wife is calling back in regards to his lacosamide (VIMPAT) 200 mg Tab tablet, stated they are needing to have this re-qualified for the program since it is the first renewal of the year. Stated he needs this to be done soon because he took his last pill yesterday and they are needing this ASAP. Can we please look into this and call back to advise at [phone]. Thank You.

## 2024-02-20 ENCOUNTER — TELEPHONE (OUTPATIENT)
Dept: NEUROLOGY | Facility: CLINIC | Age: 59
End: 2024-02-20
Payer: MEDICARE

## 2024-02-20 DIAGNOSIS — G40.909 SEIZURE DISORDER: ICD-10-CM

## 2024-02-20 DIAGNOSIS — G40.219 COMPLEX PARTIAL EPILEPSY WITH GENERALIZATION AND WITH INTRACTABLE EPILEPSY: ICD-10-CM

## 2024-02-20 RX ORDER — LACOSAMIDE 200 MG/1
200 TABLET ORAL EVERY 12 HOURS
Qty: 180 TABLET | Refills: 1 | Status: SHIPPED | OUTPATIENT
Start: 2024-02-20 | End: 2024-02-27 | Stop reason: SDUPTHER

## 2024-02-20 NOTE — TELEPHONE ENCOUNTER
Faxed Vimpat assistance renewal application to [facility] [phone]. Patients wife informed.

## 2024-02-27 DIAGNOSIS — G40.219 COMPLEX PARTIAL EPILEPSY WITH GENERALIZATION AND WITH INTRACTABLE EPILEPSY: ICD-10-CM

## 2024-02-27 DIAGNOSIS — G40.909 SEIZURE DISORDER: ICD-10-CM

## 2024-02-27 RX ORDER — LACOSAMIDE 200 MG/1
200 TABLET ORAL EVERY 12 HOURS
Qty: 180 TABLET | Refills: 1 | Status: SHIPPED | OUTPATIENT
Start: 2024-02-27

## 2024-02-27 NOTE — TELEPHONE ENCOUNTER
----- Message from Irasema Soares sent at 2/27/2024  8:52 AM CST -----  Type:  RX Refill Request    Who Called:  pt's wife Ashlyn  Refill or New Rx:  refill  RX Name and Strength:  lacosamide (VIMPAT) 200 mg Tab tablet  How is the patient currently taking it? (ex. 1XDay):  as directed  Is this a 30 day or 90 day RX:  90  Preferred Pharmacy with phone number:  [facility]    Best Call Back Number:  [phone]    Additional Information:  Ashlyn is calling in regards to  needing the office to send his Rx to the [facility]. Pt has been out for two weeks and needs this Rx ASAP. Please call back and advise. Thanks!

## 2024-02-29 ENCOUNTER — TELEPHONE (OUTPATIENT)
Dept: NEUROLOGY | Facility: CLINIC | Age: 59
End: 2024-02-29
Payer: MEDICARE

## 2024-02-29 NOTE — TELEPHONE ENCOUNTER
----- Message from Florencia Tang sent at 2/29/2024  1:38 PM CST -----  Type:  Needs Medical Advice    Who Called:  Pt's wife Ashlyn    Would the patient rather a call back or a response via [facility]?  Call back    Best Call Back Number:  [phone]    Additional Information:  Ashlyn needs the Vimpat assistance renewal application faxed to [facility] [phone]. They still have not received it and need to reach out or refax. Pt has been out of medication for 2 weeks now. Please notify Ashlyn as soon as it has been resent or what is being done.  Please call back to advise. Thanks!    
Spoke to pt spouse. Informed her I have received fax confirmation of the application after re-faxing application to [phone]. Pt spouse v/u.    
Vital Signs Reviewed  GEN: Comfortable, NAD, AAOx3  HEENT: NCAT, EOMI, MMM, Neck Supple  RESP: CTAB, No rales/rhonchi/wheezing  CV: RRR, S1S2, No murmurs  ABD: No TTP, ND, No masses, No CVA Tenderness  Extrem/Skin: TTP of medial aspect of L heel with no skin changes/edema/deformity, Equal pulses bilat, No cyanosis/edema/rashes  Neuro: Antalgic gait, No focal deficits

## 2024-03-06 ENCOUNTER — TELEPHONE (OUTPATIENT)
Dept: NEUROLOGY | Facility: CLINIC | Age: 59
End: 2024-03-06
Payer: MEDICARE

## 2024-03-06 NOTE — TELEPHONE ENCOUNTER
Patients wife states [facility] still does not have assistance renewal, they only have the prescription that was faxed. Faxed renewal forms and prescription to [facility] at [phone] and [phone]. Called [facility] at [phone] to confirm they have forms. Waited on hold and then was transferred to [facility].

## 2024-03-06 NOTE — TELEPHONE ENCOUNTER
----- Message from Constance Coleman sent at 3/6/2024  9:14 AM CST -----  Type:  Patient Returning Call    Who Called:  pt   Who Left Message for Patient:  nurse   Does the patient know what this is regarding?:  yes   Best Call Back Number:  [phone]    Additional Information:  please advise

## 2024-03-07 ENCOUNTER — TELEPHONE (OUTPATIENT)
Dept: NEUROLOGY | Facility: CLINIC | Age: 59
End: 2024-03-07
Payer: MEDICARE

## 2024-03-07 NOTE — TELEPHONE ENCOUNTER
Faxed [facility] renewal application and script to [facility] at [phone]. Fax confirmation received.

## 2024-03-08 NOTE — TELEPHONE ENCOUNTER
Called [facility] and spoke with Traci. Was able to confirm application received but not processed because the form does not look like the normal form. Traci submitted patients information and should have an answer in 24 hrs but due to being the weekend, patient will not be contacted until Monday. Spoke with patients wife and informed of this.

## 2024-03-12 ENCOUNTER — TELEPHONE (OUTPATIENT)
Dept: NEUROLOGY | Facility: CLINIC | Age: 59
End: 2024-03-12
Payer: MEDICARE

## 2024-03-12 NOTE — TELEPHONE ENCOUNTER
----- Message from Rashaad Chery sent at 3/12/2024  9:52 AM CDT -----  Regarding: [facility] Patient Assistance [facility]  Type:  Pharmacy Calling to Clarify an RX    Name of Caller:  Isa    Pharmacy Name:  [facility]    Prescription Name:  lacosamide (VIMPAT) 200 mg Tab tablet    What do they need to clarify?:  checking to see form was received that was faxed on 3/7/24 b/c not returned as of time of this message    Best Call Back Number:  [phone]    Additional Information:  please call to let know if not received. If received, please complete and fax back to [phone]

## 2024-03-12 NOTE — TELEPHONE ENCOUNTER
Returned call, on hold x 10 min before being transferred to leave a message. I discontinued the call, faxed all the pt information also with the completed Vimpat form with a note on eh cover page indicating our office reached out twice today for assistance and was anable to speak with a rep. I requested they review the information provided and enroll the pt in the program for Vimpat. Fax marked as urgent

## 2024-03-12 NOTE — TELEPHONE ENCOUNTER
Returned call to [facility]. Was transferred to voicemail due to high call volume after waiting on hold for 10 minutes to speak with a representative. Left detailed message with call information with Traci/[facility] from 3/8/24 and all dates application was faxed to them. Waiting for call back.

## 2024-03-18 ENCOUNTER — TELEPHONE (OUTPATIENT)
Dept: NEUROLOGY | Facility: CLINIC | Age: 59
End: 2024-03-18
Payer: MEDICARE

## 2024-03-18 NOTE — TELEPHONE ENCOUNTER
Patients wife states [facility] still will not fill patients Vimpat because the correct form was not signed. Patients wife will come up on 3/19/24 to sign form.

## 2024-03-18 NOTE — TELEPHONE ENCOUNTER
----- Message from Trish Mcconnell, Patient Care Assistant sent at 3/18/2024  2:42 PM CDT -----  Regarding: advice  Contact: Ashlyn murguia's wife  Type: Needs Medical Advice    Who Called:  Ashlyn murguia's wife     Best Call Back Number: [phone] (home)     Additional Information: Ashlyn murguia's wife states she would like a callback regarding coming to the clinic to sign a form. Please call to advise. Thanks!

## 2024-03-19 ENCOUNTER — OFFICE VISIT (OUTPATIENT)
Dept: NEUROLOGY | Facility: CLINIC | Age: 59
End: 2024-03-19
Payer: MEDICARE

## 2024-03-19 VITALS
HEIGHT: 72 IN | WEIGHT: 175.06 LBS | BODY MASS INDEX: 23.71 KG/M2 | SYSTOLIC BLOOD PRESSURE: 118 MMHG | DIASTOLIC BLOOD PRESSURE: 67 MMHG | HEART RATE: 54 BPM

## 2024-03-19 DIAGNOSIS — Z96.89 S/P PLACEMENT OF VNS (VAGUS NERVE STIMULATION) DEVICE: ICD-10-CM

## 2024-03-19 DIAGNOSIS — G40.219 COMPLEX PARTIAL EPILEPSY WITH GENERALIZATION AND WITH INTRACTABLE EPILEPSY: Primary | ICD-10-CM

## 2024-03-19 DIAGNOSIS — Z59.89 INSURANCE COVERAGE PROBLEMS: ICD-10-CM

## 2024-03-19 DIAGNOSIS — G40.909 SEIZURE DISORDER: ICD-10-CM

## 2024-03-19 PROCEDURE — 95970 ALYS NPGT W/O PRGRMG: CPT | Mod: S$GLB,,, | Performed by: PSYCHIATRY & NEUROLOGY

## 2024-03-19 PROCEDURE — 3008F BODY MASS INDEX DOCD: CPT | Mod: CPTII,S$GLB,, | Performed by: PSYCHIATRY & NEUROLOGY

## 2024-03-19 PROCEDURE — 99999 PR PBB SHADOW E&M-EST. PATIENT-LVL IV: CPT | Mod: PBBFAC,,, | Performed by: PSYCHIATRY & NEUROLOGY

## 2024-03-19 PROCEDURE — 3074F SYST BP LT 130 MM HG: CPT | Mod: CPTII,S$GLB,, | Performed by: PSYCHIATRY & NEUROLOGY

## 2024-03-19 PROCEDURE — 99215 OFFICE O/P EST HI 40 MIN: CPT | Mod: 25,S$GLB,, | Performed by: PSYCHIATRY & NEUROLOGY

## 2024-03-19 PROCEDURE — 1159F MED LIST DOCD IN RCRD: CPT | Mod: CPTII,S$GLB,, | Performed by: PSYCHIATRY & NEUROLOGY

## 2024-03-19 PROCEDURE — 3078F DIAST BP <80 MM HG: CPT | Mod: CPTII,S$GLB,, | Performed by: PSYCHIATRY & NEUROLOGY

## 2024-03-19 PROCEDURE — 1160F RVW MEDS BY RX/DR IN RCRD: CPT | Mod: CPTII,S$GLB,, | Performed by: PSYCHIATRY & NEUROLOGY

## 2024-03-19 RX ORDER — FERROUS GLUCONATE 324(38)MG
324 TABLET ORAL
COMMUNITY
Start: 2023-11-29 | End: 2024-05-27

## 2024-03-19 RX ORDER — LAMOTRIGINE 150 MG/1
150 TABLET ORAL 2 TIMES DAILY
Qty: 180 TABLET | Refills: 3 | Status: SHIPPED | OUTPATIENT
Start: 2024-03-19

## 2024-03-19 SDOH — SOCIAL DETERMINANTS OF HEALTH (SDOH): OTHER PROBLEMS RELATED TO HOUSING AND ECONOMIC CIRCUMSTANCES: Z59.89

## 2024-03-21 ENCOUNTER — TELEPHONE (OUTPATIENT)
Dept: NEUROLOGY | Facility: CLINIC | Age: 59
End: 2024-03-21
Payer: MEDICARE

## 2024-04-01 ENCOUNTER — TELEPHONE (OUTPATIENT)
Dept: NEUROSURGERY | Facility: CLINIC | Age: 59
End: 2024-04-01

## 2024-04-01 NOTE — TELEPHONE ENCOUNTER
Patient appointment rescheduled to sooner date. Patient Would like to inform Dr Fuller that he received his Vimpat.

## 2024-04-17 ENCOUNTER — OFFICE VISIT (OUTPATIENT)
Dept: NEUROSURGERY | Facility: CLINIC | Age: 59
End: 2024-04-17
Payer: MEDICARE

## 2024-04-17 VITALS
BODY MASS INDEX: 23.71 KG/M2 | WEIGHT: 175.06 LBS | RESPIRATION RATE: 18 BRPM | HEIGHT: 72 IN | HEART RATE: 56 BPM | SYSTOLIC BLOOD PRESSURE: 117 MMHG | DIASTOLIC BLOOD PRESSURE: 72 MMHG

## 2024-04-17 DIAGNOSIS — G40.219 COMPLEX PARTIAL EPILEPSY WITH GENERALIZATION AND WITH INTRACTABLE EPILEPSY: ICD-10-CM

## 2024-04-17 DIAGNOSIS — G40.909 SEIZURE DISORDER: ICD-10-CM

## 2024-04-17 DIAGNOSIS — Z96.89 S/P PLACEMENT OF VNS (VAGUS NERVE STIMULATION) DEVICE: ICD-10-CM

## 2024-04-17 PROCEDURE — 99205 OFFICE O/P NEW HI 60 MIN: CPT | Mod: S$GLB,,, | Performed by: NEUROLOGICAL SURGERY

## 2024-04-17 PROCEDURE — 3074F SYST BP LT 130 MM HG: CPT | Mod: CPTII,S$GLB,, | Performed by: NEUROLOGICAL SURGERY

## 2024-04-17 PROCEDURE — 3008F BODY MASS INDEX DOCD: CPT | Mod: CPTII,S$GLB,, | Performed by: NEUROLOGICAL SURGERY

## 2024-04-17 PROCEDURE — 3078F DIAST BP <80 MM HG: CPT | Mod: CPTII,S$GLB,, | Performed by: NEUROLOGICAL SURGERY

## 2024-04-17 PROCEDURE — 1159F MED LIST DOCD IN RCRD: CPT | Mod: CPTII,S$GLB,, | Performed by: NEUROLOGICAL SURGERY

## 2024-04-17 NOTE — PROGRESS NOTES
Neurosurgery History & Physical    Patient ID: Earnest Morgan is a 59 y.o. male.    Chief Complaint   Patient presents with    VNS       HPI:  Mr. Morgan is a 60yo male who presents with his wife for VNS battery change.  He had a VNS device placed on 08/23/18 for seizure disorder and complex partial epilepsy with generalization and with intractable epilepsy.  He reports great success with the VNS device and his wife states that he has had maybe three seizures since it was placed.  His VNS battery was found to be low when interrogated during visit with Dr. Rach Fuller on 03/19/24.      Review of Systems   Constitutional:  Negative for activity change and fever.   Eyes:  Negative for visual disturbance.   Respiratory:  Negative for shortness of breath.    Cardiovascular:  Negative for chest pain.   Gastrointestinal:  Negative for nausea and vomiting.   Endocrine: Negative for cold intolerance, heat intolerance, polydipsia, polyphagia and polyuria.   Genitourinary:  Negative for decreased urine volume, difficulty urinating and frequency.   Musculoskeletal:  Negative for back pain and neck pain.   Neurological:  Negative for dizziness, tremors, seizures, syncope, facial asymmetry, speech difficulty, weakness, light-headedness, numbness and headaches.   Psychiatric/Behavioral:  Negative for confusion.        Past Medical History:   Diagnosis Date    Hypertension     Loose, teeth     Seizures      Social History     Socioeconomic History    Marital status:    Tobacco Use    Smoking status: Never    Smokeless tobacco: Never   Substance and Sexual Activity    Alcohol use: No    Drug use: No     Family History   Problem Relation Name Age of Onset    Cancer Mother          lung    Diabetes Mother      Heart attack Father      Diabetes Father      Stroke Sister      Diabetes Brother      Diabetes Sister       Review of patient's allergies indicates:  No Known Allergies    Current Outpatient Medications:      amlodipine (NORVASC) 10 MG tablet, Take 10 mg by mouth every morning. , Disp: , Rfl:     CYANOCOBALAMIN, VITAMIN B-12, (VITAMIN B-12 ORAL), Take 500 mcg by mouth every morning. , Disp: , Rfl:     lacosamide (VIMPAT) 200 mg Tab tablet, Take 1 tablet (200 mg total) by mouth every 12 (twelve) hours., Disp: 180 tablet, Rfl: 1    lamoTRIgine (LAMICTAL) 150 MG Tab, Take 1 tablet (150 mg total) by mouth 2 (two) times daily., Disp: 180 tablet, Rfl: 3    sertraline (ZOLOFT) 50 MG tablet, Take 1 tablet (50 mg total) by mouth once daily., Disp: 30 tablet, Rfl: 11    ferrous gluconate (FERGON) 324 MG tablet, Take 324 mg by mouth daily with breakfast., Disp: , Rfl:     LIDOcaine-EPINEPHrine 1%-1:100,000 1 %-1:100,000 injection, 5 mLs., Disp: , Rfl:     lisinopril (PRINIVIL,ZESTRIL) 40 MG tablet, 40 mg., Disp: , Rfl: 1    testosterone cypionate (DEPOTESTOTERONE CYPIONATE) 200 mg/mL injection, Inject into the muscle., Disp: , Rfl:   No current facility-administered medications for this visit.    Facility-Administered Medications Ordered in Other Visits:     mupirocin 2 % ointment, , Nasal, On Call Procedure, Raeann Man MD  Blood pressure 117/72, pulse (!) 56, resp. rate 18, height 6' (1.829 m), weight 79.4 kg (175 lb 0.7 oz).      Neurologic Exam     Mental Status   Oriented to person, place, and time.   Level of consciousness: alert      Physical Exam  Neurological:      Mental Status: He is oriented to person, place, and time.       Chest incision examined.  Well healed.    Imaging:  No new imaging to review    Assessment/Plan:    Mr. Morgan is a 58yo male with seizure disorder, complex partial epilepsy with generalization and with intractable epilepsy, and s/p placement of VNS device on 08/23/18 by Dr. Adam.  His VNS battery was found to be low when interrogated during visit with Dr. Rach Fuller on 03/19/24.      I dicussed the surgery and its risks.  After discussing, Mr. Caballeroell would like to proceed with the VNS battery  replacement.    After VNS battery replaced, Dr. Fuller will continue to manage VNS device.

## 2024-04-24 ENCOUNTER — TELEPHONE (OUTPATIENT)
Dept: NEUROSURGERY | Facility: CLINIC | Age: 59
End: 2024-04-24
Payer: MEDICARE

## 2024-04-24 DIAGNOSIS — Z96.89 S/P PLACEMENT OF VNS (VAGUS NERVE STIMULATION) DEVICE: Primary | ICD-10-CM

## 2024-04-24 NOTE — TELEPHONE ENCOUNTER
-pt wife called  -stated that pre-op doesn't have husbands date in yet  -informed pt I would get on it asap

## 2024-04-25 ENCOUNTER — TELEPHONE (OUTPATIENT)
Dept: NEUROSURGERY | Facility: CLINIC | Age: 59
End: 2024-04-25
Payer: MEDICARE

## 2024-04-25 NOTE — TELEPHONE ENCOUNTER
-called pt and wife picked up  -informed the pt's wife that pre op date is on 4/30/24 at 1:15  -pt wife agreed to time and date

## 2024-05-03 ENCOUNTER — TELEPHONE (OUTPATIENT)
Dept: NEUROSURGERY | Facility: CLINIC | Age: 59
End: 2024-05-03
Payer: MEDICARE

## 2024-05-03 NOTE — TELEPHONE ENCOUNTER
I spoke with Mr. Morgan's wife this AM after VNS battery replacement yesterday.  She reports that he is doing well today.  She states that his pain is managed and he is having no issues.  Mr. Morgan has a 2-week wound check in our clinic on 05/14/24 at 11:00 am and his next appointment with Dr. Fuller is scheduled for 09/24/24 at 3:00 pm.  She was advised to notify our office if Mr. Morgan needs anything prior to his scheduled wound check.  She verbalized understanding.

## 2024-06-20 DIAGNOSIS — G40.219 COMPLEX PARTIAL EPILEPSY WITH GENERALIZATION AND WITH INTRACTABLE EPILEPSY: ICD-10-CM

## 2024-06-20 DIAGNOSIS — G40.909 SEIZURE DISORDER: ICD-10-CM

## 2024-06-21 RX ORDER — LACOSAMIDE 200 MG/1
200 TABLET, FILM COATED ORAL EVERY 12 HOURS
Qty: 180 TABLET | Refills: 1 | Status: SHIPPED | OUTPATIENT
Start: 2024-06-21

## 2024-07-17 DIAGNOSIS — G40.219 COMPLEX PARTIAL EPILEPSY WITH GENERALIZATION AND WITH INTRACTABLE EPILEPSY: ICD-10-CM

## 2024-07-17 RX ORDER — LAMOTRIGINE 150 MG/1
150 TABLET ORAL 2 TIMES DAILY
Qty: 180 TABLET | Refills: 3 | Status: SHIPPED | OUTPATIENT
Start: 2024-07-17

## 2024-09-24 ENCOUNTER — LAB VISIT (OUTPATIENT)
Dept: LAB | Facility: HOSPITAL | Age: 59
End: 2024-09-24
Attending: PSYCHIATRY & NEUROLOGY
Payer: MEDICARE

## 2024-09-24 ENCOUNTER — TELEPHONE (OUTPATIENT)
Dept: NEUROLOGY | Facility: CLINIC | Age: 59
End: 2024-09-24

## 2024-09-24 ENCOUNTER — OFFICE VISIT (OUTPATIENT)
Dept: NEUROLOGY | Facility: CLINIC | Age: 59
End: 2024-09-24
Payer: MEDICARE

## 2024-09-24 VITALS
HEIGHT: 72 IN | WEIGHT: 180.25 LBS | RESPIRATION RATE: 14 BRPM | SYSTOLIC BLOOD PRESSURE: 135 MMHG | BODY MASS INDEX: 24.41 KG/M2 | HEART RATE: 50 BPM | DIASTOLIC BLOOD PRESSURE: 89 MMHG

## 2024-09-24 DIAGNOSIS — G40.909 SEIZURE DISORDER: ICD-10-CM

## 2024-09-24 DIAGNOSIS — Z79.899 ON FOUR OR MORE MEDICATIONS: ICD-10-CM

## 2024-09-24 DIAGNOSIS — R41.3 OTHER AMNESIA: ICD-10-CM

## 2024-09-24 DIAGNOSIS — R41.3 OTHER AMNESIA: Primary | ICD-10-CM

## 2024-09-24 DIAGNOSIS — G40.219 COMPLEX PARTIAL EPILEPSY WITH GENERALIZATION AND WITH INTRACTABLE EPILEPSY: ICD-10-CM

## 2024-09-24 LAB
ALBUMIN SERPL BCP-MCNC: 4.1 G/DL (ref 3.5–5.2)
ALP SERPL-CCNC: 83 U/L (ref 55–135)
ALT SERPL W/O P-5'-P-CCNC: 10 U/L (ref 10–44)
ANION GAP SERPL CALC-SCNC: 8 MMOL/L (ref 8–16)
AST SERPL-CCNC: 16 U/L (ref 10–40)
BASOPHILS # BLD AUTO: 0.06 K/UL (ref 0–0.2)
BASOPHILS NFR BLD: 0.7 % (ref 0–1.9)
BILIRUB SERPL-MCNC: 0.3 MG/DL (ref 0.1–1)
BUN SERPL-MCNC: 29 MG/DL (ref 6–20)
CALCIUM SERPL-MCNC: 9.1 MG/DL (ref 8.7–10.5)
CHLORIDE SERPL-SCNC: 114 MMOL/L (ref 95–110)
CO2 SERPL-SCNC: 19 MMOL/L (ref 23–29)
CREAT SERPL-MCNC: 1.7 MG/DL (ref 0.5–1.4)
DIFFERENTIAL METHOD BLD: ABNORMAL
EOSINOPHIL # BLD AUTO: 1.6 K/UL (ref 0–0.5)
EOSINOPHIL NFR BLD: 19.6 % (ref 0–8)
ERYTHROCYTE [DISTWIDTH] IN BLOOD BY AUTOMATED COUNT: 13.5 % (ref 11.5–14.5)
EST. GFR  (NO RACE VARIABLE): 46.2 ML/MIN/1.73 M^2
ESTIMATED AVG GLUCOSE: 100 MG/DL (ref 68–131)
GLUCOSE SERPL-MCNC: 81 MG/DL (ref 70–110)
HBA1C MFR BLD: 5.1 % (ref 4–5.6)
HCT VFR BLD AUTO: 38.6 % (ref 40–54)
HGB BLD-MCNC: 12 G/DL (ref 14–18)
IMM GRANULOCYTES # BLD AUTO: 0.04 K/UL (ref 0–0.04)
IMM GRANULOCYTES NFR BLD AUTO: 0.5 % (ref 0–0.5)
LYMPHOCYTES # BLD AUTO: 1.5 K/UL (ref 1–4.8)
LYMPHOCYTES NFR BLD: 18.9 % (ref 18–48)
MCH RBC QN AUTO: 29.8 PG (ref 27–31)
MCHC RBC AUTO-ENTMCNC: 31.1 G/DL (ref 32–36)
MCV RBC AUTO: 96 FL (ref 82–98)
MONOCYTES # BLD AUTO: 0.6 K/UL (ref 0.3–1)
MONOCYTES NFR BLD: 6.7 % (ref 4–15)
NEUTROPHILS # BLD AUTO: 4.4 K/UL (ref 1.8–7.7)
NEUTROPHILS NFR BLD: 53.6 % (ref 38–73)
NRBC BLD-RTO: 0 /100 WBC
PLATELET # BLD AUTO: 155 K/UL (ref 150–450)
PMV BLD AUTO: 11.4 FL (ref 9.2–12.9)
POTASSIUM SERPL-SCNC: 4.8 MMOL/L (ref 3.5–5.1)
PROT SERPL-MCNC: 7.1 G/DL (ref 6–8.4)
RBC # BLD AUTO: 4.03 M/UL (ref 4.6–6.2)
SODIUM SERPL-SCNC: 141 MMOL/L (ref 136–145)
TSH SERPL DL<=0.005 MIU/L-ACNC: 3.12 UIU/ML (ref 0.4–4)
WBC # BLD AUTO: 8.16 K/UL (ref 3.9–12.7)

## 2024-09-24 PROCEDURE — 99215 OFFICE O/P EST HI 40 MIN: CPT | Mod: S$GLB,,, | Performed by: PSYCHIATRY & NEUROLOGY

## 2024-09-24 PROCEDURE — 99999 PR PBB SHADOW E&M-EST. PATIENT-LVL V: CPT | Mod: PBBFAC,,, | Performed by: PSYCHIATRY & NEUROLOGY

## 2024-09-24 PROCEDURE — 95976 ALYS SMPL CN NPGT PRGRMG: CPT | Mod: S$GLB,,, | Performed by: PSYCHIATRY & NEUROLOGY

## 2024-09-24 PROCEDURE — G2211 COMPLEX E/M VISIT ADD ON: HCPCS | Mod: S$GLB,,, | Performed by: PSYCHIATRY & NEUROLOGY

## 2024-09-24 PROCEDURE — 36415 COLL VENOUS BLD VENIPUNCTURE: CPT | Mod: PO | Performed by: PSYCHIATRY & NEUROLOGY

## 2024-09-24 PROCEDURE — 1159F MED LIST DOCD IN RCRD: CPT | Mod: CPTII,S$GLB,, | Performed by: PSYCHIATRY & NEUROLOGY

## 2024-09-24 PROCEDURE — 80053 COMPREHEN METABOLIC PANEL: CPT | Performed by: PSYCHIATRY & NEUROLOGY

## 2024-09-24 PROCEDURE — 1160F RVW MEDS BY RX/DR IN RCRD: CPT | Mod: CPTII,S$GLB,, | Performed by: PSYCHIATRY & NEUROLOGY

## 2024-09-24 PROCEDURE — 3008F BODY MASS INDEX DOCD: CPT | Mod: CPTII,S$GLB,, | Performed by: PSYCHIATRY & NEUROLOGY

## 2024-09-24 PROCEDURE — 3075F SYST BP GE 130 - 139MM HG: CPT | Mod: CPTII,S$GLB,, | Performed by: PSYCHIATRY & NEUROLOGY

## 2024-09-24 PROCEDURE — 84443 ASSAY THYROID STIM HORMONE: CPT | Performed by: PSYCHIATRY & NEUROLOGY

## 2024-09-24 PROCEDURE — 80235 DRUG ASSAY LACOSAMIDE: CPT | Performed by: PSYCHIATRY & NEUROLOGY

## 2024-09-24 PROCEDURE — 83036 HEMOGLOBIN GLYCOSYLATED A1C: CPT | Performed by: PSYCHIATRY & NEUROLOGY

## 2024-09-24 PROCEDURE — 80175 DRUG SCREEN QUAN LAMOTRIGINE: CPT | Performed by: PSYCHIATRY & NEUROLOGY

## 2024-09-24 PROCEDURE — 82607 VITAMIN B-12: CPT | Performed by: PSYCHIATRY & NEUROLOGY

## 2024-09-24 PROCEDURE — 85025 COMPLETE CBC W/AUTO DIFF WBC: CPT | Performed by: PSYCHIATRY & NEUROLOGY

## 2024-09-24 PROCEDURE — 84425 ASSAY OF VITAMIN B-1: CPT | Performed by: PSYCHIATRY & NEUROLOGY

## 2024-09-24 PROCEDURE — 3079F DIAST BP 80-89 MM HG: CPT | Mod: CPTII,S$GLB,, | Performed by: PSYCHIATRY & NEUROLOGY
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
## 2024-09-25 LAB — VIT B12 SERPL-MCNC: 334 PG/ML (ref 210–950)

## 2024-09-26 LAB — LACOSAMIDE: 8.6 MCG/ML (ref 1–10)

## 2024-09-27 LAB — LAMOTRIGINE SERPL-MCNC: 4 UG/ML (ref 2–15)

## 2024-10-01 LAB — VIT B1 BLD-MCNC: 73 UG/L (ref 38–122)

## 2024-10-02 ENCOUNTER — TELEPHONE (OUTPATIENT)
Dept: NEUROLOGY | Facility: CLINIC | Age: 59
End: 2024-10-02
Payer: MEDICARE

## 2024-10-02 NOTE — TELEPHONE ENCOUNTER
Spoke with patients wife and in formed of lab results. Confirmed patient scheduled for MRI on 10/8 and EEG on 10/9. Advised will call with the results of both tests. Patients wife voiced understanding.

## 2024-10-02 NOTE — TELEPHONE ENCOUNTER
----- Message from Rach Fuller MD sent at 10/2/2024  1:48 PM CDT -----  The lamictal and vimpat levels are fine. The B12 level is borderline low. You could take 1000 mcg B12 supplement daily to help boost levels. Other labs are ok or similar to what they have been in the past

## 2024-10-08 ENCOUNTER — OFFICE VISIT (OUTPATIENT)
Dept: NEUROLOGY | Facility: CLINIC | Age: 59
End: 2024-10-08
Payer: MEDICARE

## 2024-10-08 ENCOUNTER — HOSPITAL ENCOUNTER (OUTPATIENT)
Dept: RADIOLOGY | Facility: HOSPITAL | Age: 59
Discharge: HOME | End: 2024-10-08
Attending: PSYCHIATRY & NEUROLOGY
Payer: MEDICARE

## 2024-10-08 VITALS — HEART RATE: 57 BPM | RESPIRATION RATE: 15 BRPM | DIASTOLIC BLOOD PRESSURE: 79 MMHG | SYSTOLIC BLOOD PRESSURE: 145 MMHG

## 2024-10-08 DIAGNOSIS — Z96.89 S/P PLACEMENT OF VNS (VAGUS NERVE STIMULATION) DEVICE: ICD-10-CM

## 2024-10-08 DIAGNOSIS — G40.219 COMPLEX PARTIAL EPILEPSY WITH GENERALIZATION AND WITH INTRACTABLE EPILEPSY: Primary | ICD-10-CM

## 2024-10-08 DIAGNOSIS — R41.3 OTHER AMNESIA: ICD-10-CM

## 2024-10-08 PROCEDURE — 99499 UNLISTED E&M SERVICE: CPT | Mod: S$GLB,,, | Performed by: PSYCHIATRY & NEUROLOGY

## 2024-10-08 PROCEDURE — 70551 MRI BRAIN STEM W/O DYE: CPT | Mod: 26,,, | Performed by: RADIOLOGY

## 2024-10-08 PROCEDURE — 99999 PR PBB SHADOW E&M-EST. PATIENT-LVL III: CPT | Mod: PBBFAC,,, | Performed by: PSYCHIATRY & NEUROLOGY

## 2024-10-08 PROCEDURE — 70551 MRI BRAIN STEM W/O DYE: CPT | Mod: TC,PO

## 2024-10-08 PROCEDURE — 95976 ALYS SMPL CN NPGT PRGRMG: CPT | Mod: S$GLB,,, | Performed by: PSYCHIATRY & NEUROLOGY

## 2024-10-08 NOTE — PROGRESS NOTES
Patient presented to have VNS turned off before MRI. VNS turned off at 3pm. Will come back to have it turned back on after MRI.     VNS settings   AspireSR [id number] S/N: [id number]  Implant Date: Aug 23, 2018, battery replaced 5/2/2024     Output current (mA):               1.25 ----> 0  Signal frequency (Hz):            20   Pulse width (µs):                     250   Signal on time (s):                   30  Signal off time (min):               5     Autostim current (mA):            1.375 ----> 0  Autostim on time (s):               30  Autostim pulse width (µs):      250      Magnet current (mA):              1.5 ----> 0  Magnet on time (s):                 60  Magnet pulse width (µs):       250      System diagnostics:  Lead impedance:                    OK  Impedence value (?):             2880  Near end of service:                %      ---------------------------------------------------------------------------------------------    Patient returned and VNS was turned back on at 4:15pm. 3 settings changed. Tolerated well.     VNS settings   AspireSR M106 S/N: [id number]  Implant Date: Aug 23, 2018, battery replaced 5/2/2024     Output current (mA):               0 ---> 1.25   Signal frequency (Hz):            20   Pulse width (µs):                     250   Signal on time (s):                   30  Signal off time (min):               5     Autostim current (mA):            0 ---> 1.375   Autostim on time (s):               30  Autostim pulse width (µs):      250      Magnet current (mA):             0 ----> 1.5   Magnet on time (s):                 60  Magnet pulse width (µs):       250

## 2024-10-09 ENCOUNTER — TELEPHONE (OUTPATIENT)
Dept: NEUROLOGY | Facility: CLINIC | Age: 59
End: 2024-10-09
Payer: MEDICARE

## 2024-10-09 NOTE — TELEPHONE ENCOUNTER
Spoke with patients wife and informed of MRI results. Provided phone number for [facility] lab to schedule EEG. Patients wife voiced understanding.

## 2024-10-09 NOTE — TELEPHONE ENCOUNTER
----- Message from Rach Fuller MD sent at 10/9/2024 12:40 PM CDT -----  The MRI brain scan looks essentially unchanged from previous. It shows old head injury areas. There are also some white spots. These spots are caused by damage to the small microscopic blood vessels in the deep parts of the brain. These blood vessels are so small, they are susceptible to damage from high blood pressure, high cholesterol/plaque buildup, diabetes, tobacco use, and just aging can cause these too. Ensuring that your blood pressure, cholesterol, and blood sugar are in normal range and not smoking are important to prevent further buildup of white matter lesions in the brain.     If there are just a few, they are not felt to cause any issues, but if they buildup significantly, they can start to impair how nerves communicate with each other and this can lead to cognitive trouble sometimes. The memory testing can determine if this is the case.

## 2024-12-23 DIAGNOSIS — G40.219 COMPLEX PARTIAL EPILEPSY WITH GENERALIZATION AND WITH INTRACTABLE EPILEPSY: ICD-10-CM

## 2024-12-23 DIAGNOSIS — G40.909 SEIZURE DISORDER: ICD-10-CM

## 2024-12-24 RX ORDER — LACOSAMIDE 200 MG/1
TABLET, FILM COATED ORAL
Qty: 180 TABLET | Refills: 1 | Status: SHIPPED | OUTPATIENT
Start: 2024-12-24

## 2025-04-02 ENCOUNTER — OFFICE VISIT (OUTPATIENT)
Dept: NEUROLOGY | Facility: CLINIC | Age: 60
End: 2025-04-02
Payer: MEDICARE

## 2025-04-02 VITALS
DIASTOLIC BLOOD PRESSURE: 71 MMHG | HEIGHT: 72 IN | WEIGHT: 173.5 LBS | BODY MASS INDEX: 23.5 KG/M2 | SYSTOLIC BLOOD PRESSURE: 114 MMHG

## 2025-04-02 DIAGNOSIS — N18.9 CHRONIC KIDNEY DISEASE, UNSPECIFIED CKD STAGE: ICD-10-CM

## 2025-04-02 DIAGNOSIS — G40.219 COMPLEX PARTIAL EPILEPSY WITH GENERALIZATION AND WITH INTRACTABLE EPILEPSY: ICD-10-CM

## 2025-04-02 DIAGNOSIS — G40.909 SEIZURE DISORDER: Primary | ICD-10-CM

## 2025-04-02 PROCEDURE — 3008F BODY MASS INDEX DOCD: CPT | Mod: CPTII,S$GLB,, | Performed by: PSYCHIATRY & NEUROLOGY

## 2025-04-02 PROCEDURE — 99999 PR PBB SHADOW E&M-EST. PATIENT-LVL III: CPT | Mod: PBBFAC,,, | Performed by: PSYCHIATRY & NEUROLOGY

## 2025-04-02 PROCEDURE — 4010F ACE/ARB THERAPY RXD/TAKEN: CPT | Mod: CPTII,S$GLB,, | Performed by: PSYCHIATRY & NEUROLOGY

## 2025-04-02 PROCEDURE — 3078F DIAST BP <80 MM HG: CPT | Mod: CPTII,S$GLB,, | Performed by: PSYCHIATRY & NEUROLOGY

## 2025-04-02 PROCEDURE — 1160F RVW MEDS BY RX/DR IN RCRD: CPT | Mod: CPTII,S$GLB,, | Performed by: PSYCHIATRY & NEUROLOGY

## 2025-04-02 PROCEDURE — 1159F MED LIST DOCD IN RCRD: CPT | Mod: CPTII,S$GLB,, | Performed by: PSYCHIATRY & NEUROLOGY

## 2025-04-02 PROCEDURE — 3074F SYST BP LT 130 MM HG: CPT | Mod: CPTII,S$GLB,, | Performed by: PSYCHIATRY & NEUROLOGY

## 2025-04-02 PROCEDURE — 95970 ALYS NPGT W/O PRGRMG: CPT | Mod: S$GLB,,, | Performed by: PSYCHIATRY & NEUROLOGY

## 2025-04-02 PROCEDURE — 99215 OFFICE O/P EST HI 40 MIN: CPT | Mod: 25,S$GLB,, | Performed by: PSYCHIATRY & NEUROLOGY

## 2025-04-02 RX ORDER — ONDANSETRON 4 MG/1
TABLET, ORALLY DISINTEGRATING ORAL
COMMUNITY
Start: 2025-03-10

## 2025-04-02 RX ORDER — LAMOTRIGINE 150 MG/1
150 TABLET ORAL 2 TIMES DAILY
Qty: 180 TABLET | Refills: 3 | Status: SHIPPED | OUTPATIENT
Start: 2025-04-02

## 2025-04-02 RX ORDER — LACOSAMIDE 200 MG/1
200 TABLET ORAL EVERY 12 HOURS
Qty: 180 TABLET | Refills: 1 | Status: SHIPPED | OUTPATIENT
Start: 2025-04-02
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
## (undated) DEVICE — DRAPE STERI INSTRUMENT 1018

## (undated) DEVICE — SUT VICRYL PLUS 3-0 SH 18IN

## (undated) DEVICE — DRAPE INCISE IOBAN 2 23X17IN

## (undated) DEVICE — SUT MCRYL PLUS 4-0 PS2 27IN

## (undated) DEVICE — SUT 4/0 18IN NUROLON BLK B

## (undated) DEVICE — COVER PROBE NL STRL 3.6X96IN

## (undated) DEVICE — SEE MEDLINE ITEM 156905

## (undated) DEVICE — KIT SURGIFLO EVITHROM

## (undated) DEVICE — ELECTRODE REM PLYHSV RETURN 9

## (undated) DEVICE — ADHESIVE DERMABOND ADVANCED

## (undated) DEVICE — GAUZE SPONGE 4X4 12PLY

## (undated) DEVICE — SEE MEDLINE ITEM 153688

## (undated) DEVICE — DRESSING SURGICAL 1/2X1/2

## (undated) DEVICE — GAUZE SPONGE PEANUT STRL

## (undated) DEVICE — CORD BIPOLAR 12 FOOT

## (undated) DEVICE — DURAPREP SURG SCRUB 26ML

## (undated) DEVICE — DRAPE THYROID WITH ARMBOARD

## (undated) DEVICE — SEE MEDLINE ITEM 157150

## (undated) DEVICE — MARKER SKIN STND TIP BLUE BARR

## (undated) DEVICE — DRAPE STERI-DRAPE 1000 17X11IN

## (undated) DEVICE — TUNNELER